# Patient Record
Sex: MALE | Race: WHITE | NOT HISPANIC OR LATINO | Employment: FULL TIME | URBAN - METROPOLITAN AREA
[De-identification: names, ages, dates, MRNs, and addresses within clinical notes are randomized per-mention and may not be internally consistent; named-entity substitution may affect disease eponyms.]

---

## 2019-06-12 ENCOUNTER — OFFICE VISIT (OUTPATIENT)
Dept: FAMILY MEDICINE CLINIC | Facility: CLINIC | Age: 24
End: 2019-06-12

## 2019-06-12 VITALS
HEART RATE: 80 BPM | HEIGHT: 71 IN | TEMPERATURE: 98.1 F | BODY MASS INDEX: 33.32 KG/M2 | DIASTOLIC BLOOD PRESSURE: 80 MMHG | SYSTOLIC BLOOD PRESSURE: 130 MMHG | WEIGHT: 238 LBS

## 2019-06-12 DIAGNOSIS — Z11.3 ROUTINE SCREENING FOR STI (SEXUALLY TRANSMITTED INFECTION): ICD-10-CM

## 2019-06-12 DIAGNOSIS — S93.401A SPRAIN OF RIGHT ANKLE, UNSPECIFIED LIGAMENT, INITIAL ENCOUNTER: ICD-10-CM

## 2019-06-12 DIAGNOSIS — Z11.1 TUBERCULOSIS SCREENING: ICD-10-CM

## 2019-06-12 DIAGNOSIS — Z13.1 SCREENING FOR DIABETES MELLITUS: ICD-10-CM

## 2019-06-12 DIAGNOSIS — E66.09 CLASS 1 OBESITY DUE TO EXCESS CALORIES WITHOUT SERIOUS COMORBIDITY WITH BODY MASS INDEX (BMI) OF 33.0 TO 33.9 IN ADULT: ICD-10-CM

## 2019-06-12 DIAGNOSIS — Z13.29 SCREENING FOR THYROID DISORDER: ICD-10-CM

## 2019-06-12 DIAGNOSIS — Z13.0 SCREENING FOR DEFICIENCY ANEMIA: Primary | ICD-10-CM

## 2019-06-12 DIAGNOSIS — Z13.220 SCREENING FOR LIPID DISORDERS: ICD-10-CM

## 2019-06-12 PROCEDURE — 99204 OFFICE O/P NEW MOD 45 MIN: CPT | Performed by: NURSE PRACTITIONER

## 2019-06-12 NOTE — PROGRESS NOTES
Reid Leonardo presents today to establish care.    Chief Complaint   Patient presents with   • Establish Care     new patient today he is here to establish care and have paperwork completed.        HPI   Reid presents today to establish care.  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  He is interested in becoming a .  Other concerns include sprained ankle about 4 weeks ago.    Sprained ankle- right ankle  New.  Stable.  Reports that he was walking and carrying something very heavy, stepped in a hole, rolled his ankle sideways and pitched forward.  Did not see a provider or get an x-ray completed.  Was wearing an Ace wrap and icing affected area.  Stopped wearing a brace and icing it 1 week ago and continues to have pain when he turns it a certain way.  Does not wish to have imaging done yet.  Just wanted to let me know.    [facility] lab work needed:  Needs paperwork completed  Ppd (tspot)  hiv  Cbc  Electrolytes  lfts  Thyroid  Lipids  Urine  ekg-- will complete at next visit    Health Maintenance:  -Sees opthalmology regularly. Does not have dentist. Will work on that before next visit.  -not sexually active  -Use of seatbelt 100% of the time  -Smoke/CO detector working in home  -Nonsmoker  -Vaccines UTD he thinks-- he will bring high school records to next appt      PHQ-2/PHQ-9 Depression Screening 6/12/2019   Little interest or pleasure in doing things 0   Feeling down, depressed, or hopeless 0   Total Score 0       Review of Systems   Constitutional: Negative for activity change, fatigue, unexpected weight gain and unexpected weight loss.   HENT: Negative for congestion.    Eyes: Negative for blurred vision and visual disturbance.   Respiratory: Negative for chest tightness, shortness of breath and wheezing.    Cardiovascular: Negative for chest pain, palpitations and leg swelling.   Gastrointestinal: Negative for constipation, diarrhea, nausea, vomiting and GERD.  "  Musculoskeletal: Negative for arthralgias (right ankle).   Neurological: Negative for dizziness, light-headedness, headache and confusion.   Psychiatric/Behavioral: Negative for depressed mood and stress. The patient is not nervous/anxious.    All other systems reviewed and are negative.       Past Medical History:   Diagnosis Date   • Depression    • Headache         History reviewed. No pertinent surgical history.     Family History   Problem Relation Age of Onset   • Arthritis Mother    • Cancer Mother    • Diabetes Mother    • Mental illness Mother    • Obesity Mother    • Arthritis Father    • Diabetes Father    • Hypertension Father    • Migraines Father    • Obesity Father    • Heart attack Maternal Grandfather         Social History     Socioeconomic History   • Marital status: Single     Spouse name: Not on file   • Number of children: Not on file   • Years of education: Not on file   • Highest education level: Not on file   Occupational History   • Occupation: seminary and missionary work     Employer: [employer]   Tobacco Use   • Smoking status: Never Smoker   • Smokeless tobacco: Never Used   Substance and Sexual Activity   • Alcohol use: Yes     Comment: 2 per week   • Drug use: No   • Sexual activity: Defer        No current outpatient medications on file prior to visit.     No current facility-administered medications on file prior to visit.        Allergies   Allergen Reactions   • Menthol Unknown (See Comments)     Breathing problem        Visit Vitals  /80   Pulse 80   Temp 98.1 °F (36.7 °C)   Ht 179.1 cm (70.5\")   Wt 108 kg (238 lb)   BMI 33.67 kg/m²        Physical Exam   Constitutional: He is oriented to person, place, and time. Vital signs are normal. He appears well-developed and well-nourished.   HENT:   Head: Normocephalic.   Right Ear: Hearing, tympanic membrane, external ear and ear canal normal.   Left Ear: Hearing, tympanic membrane, external ear and ear canal normal. " "  Nose: Nose normal.   Mouth/Throat: Oropharynx is clear and moist.   Eyes: Lids are normal.   Cardiovascular: Normal rate, regular rhythm, normal heart sounds and intact distal pulses.   Pulmonary/Chest: Effort normal and breath sounds normal.   Abdominal: Soft. Bowel sounds are normal.   Musculoskeletal: Normal range of motion.        Right ankle: He exhibits normal range of motion, no swelling, no ecchymosis and normal pulse. Tenderness.   Tenderness with anterior medial movement. Describes as \"sharp discomfort but not really pain\"        Lymphadenopathy:        Head (right side): No submental, no submandibular, no tonsillar, no preauricular, no posterior auricular and no occipital adenopathy present.        Head (left side): No submental, no submandibular, no tonsillar, no preauricular, no posterior auricular and no occipital adenopathy present.     He has no cervical adenopathy.   Neurological: He is alert and oriented to person, place, and time. GCS eye subscore is 4. GCS verbal subscore is 5. GCS motor subscore is 6.   Skin: Skin is warm, dry and intact.   Psychiatric: He has a normal mood and affect. His speech is normal and behavior is normal. Judgment and thought content normal.   Nursing note and vitals reviewed.       No results found for this or any previous visit.     Assessment/Plan  Reid was seen today for establish care.    Diagnoses and all orders for this visit:    Screening for deficiency anemia  -     CBC & Differential; Future  Screening for diabetes mellitus  -     Comprehensive Metabolic Panel; Future  -     MicroAlbumin, Urine, Random - Urine, Clean Catch; Future  Screening for lipid disorders  -     Lipid Panel; Future  Screening for thyroid disorder  -     TSH Rfx On Abnormal To Free T4; Future  Routine screening for STI (sexually transmitted infection)  -     HIV-1 / O / 2 Ag / Antibody 4th Generation; Future  Tuberculosis screening  -     TSPOT; Future    Sprain of right ankle, " unspecified ligament, initial encounter  -Contine to rest, ice, compress, and elevate effected area  -If pain is still occurring at next visit, we will xray.    Obesity  Patient's Body mass index is 33.67 kg/m². BMI is above normal parameters. Recommendations include: educational material, exercise counseling and nutrition counseling.      Return in about 1 week (around 6/19/2019) for Annual. EKG and paperwork will be completed at next visit.

## 2019-06-12 NOTE — PATIENT INSTRUCTIONS
Mediterranean Diet  A Mediterranean diet refers to food and lifestyle choices that are based on the traditions of countries located on the [place]. This way of eating has been shown to help prevent certain conditions and improve outcomes for people who have chronic diseases, like kidney disease and heart disease.  What are tips for following this plan?  Lifestyle  · Cook and eat meals together with your family, when possible.  · Drink enough fluid to keep your urine clear or pale yellow.  · Be physically active every day. This includes:  ? Aerobic exercise like running or swimming.  ? Leisure activities like gardening, walking, or housework.  · Get 7-8 hours of sleep each night.  · If recommended by your health care provider, drink red wine in moderation. This means 1 glass a day for nonpregnant women and 2 glasses a day for men. A glass of wine equals 5 oz (150 mL).  Reading food labels  · Check the serving size of packaged foods. For foods such as rice and pasta, the serving size refers to the amount of cooked product, not dry.  · Check the total fat in packaged foods. Avoid foods that have saturated fat or trans fats.  · Check the ingredients list for added sugars, such as corn syrup.  Shopping  · At the grocery store, buy most of your food from the areas near the walls of the store. This includes:  ? Fresh fruits and vegetables (produce).  ? Grains, beans, nuts, and seeds. Some of these may be available in unpackaged forms or large amounts (in bulk).  ? Fresh seafood.  ? Poultry and eggs.  ? Low-fat dairy products.  · Buy whole ingredients instead of prepackaged foods.  · Buy fresh fruits and vegetables in-season from local farmers markets.  · Buy frozen fruits and vegetables in resealable bags.  · If you do not have access to quality fresh seafood, buy precooked frozen shrimp or canned fish, such as tuna, salmon, or sardines.  · Buy small amounts of raw or cooked vegetables, salads, or olives from the  deli or salad bar at your store.  · Stock your pantry so you always have certain foods on hand, such as olive oil, canned tuna, canned tomatoes, rice, pasta, and beans.  Cooking  · Cook foods with extra-virgin olive oil instead of using butter or other vegetable oils.  · Have meat as a side dish, and have vegetables or grains as your main dish. This means having meat in small portions or adding small amounts of meat to foods like pasta or stew.  · Use beans or vegetables instead of meat in common dishes like chili or lasagna.  · Biddle with different cooking methods. Try roasting or broiling vegetables instead of steaming or sautéeing them.  · Add frozen vegetables to soups, stews, pasta, or rice.  · Add nuts or seeds for added healthy fat at each meal. You can add these to yogurt, salads, or vegetable dishes.  · Marinate fish or vegetables using olive oil, lemon juice, garlic, and fresh herbs.  Meal planning  · Plan to eat 1 vegetarian meal one day each week. Try to work up to 2 vegetarian meals, if possible.  · Eat seafood 2 or more times a week.  · Have healthy snacks readily available, such as:  ? Vegetable sticks with hummus.  ? Greek yogurt.  ? Fruit and nut trail mix.  · Eat balanced meals throughout the week. This includes:  ? Fruit: 2-3 servings a day  ? Vegetables: 4-5 servings a day  ? Low-fat dairy: 2 servings a day  ? Fish, poultry, or lean meat: 1 serving a day  ? Beans and legumes: 2 or more servings a week  ? Nuts and seeds: 1-2 servings a day  ? Whole grains: 6-8 servings a day  ? Extra-virgin olive oil: 3-4 servings a day  · Limit red meat and sweets to only a few servings a month  What are my food choices?  · Mediterranean diet  ? Recommended  ? Grains: Whole-grain pasta. Brown rice. Bulgar wheat. Polenta. Couscous. Whole-wheat bread. Oatmeal. Quinoa.  ? Vegetables: Artichokes. Beets. Broccoli. Cabbage. Carrots. Eggplant. Green beans. Chard. Kale. Spinach. Onions. Leeks. Peas. Squash.  Tomatoes. Peppers. Radishes.  ? Fruits: Apples. Apricots. Avocado. Berries. Bananas. Cherries. Dates. Figs. Grapes. Douglas. Melon. Oranges. Peaches. Plums. Pomegranate.  ? Meats and other protein foods: Beans. Almonds. Sunflower seeds. Pine nuts. Peanuts. Cod. [place]. Scallops. Shrimp. Tuna. Tilapia. Clams. Oysters. Eggs.  ? Dairy: Low-fat milk. Cheese. Greek yogurt.  ? Beverages: Water. Red wine. Herbal tea.  ? Fats and oils: Extra virgin olive oil. Avocado oil. Grape seed oil.  ? Sweets and desserts: Greek yogurt with honey. Baked apples. Poached pears. Trail mix.  ? Seasoning and other foods: Basil. Cilantro. Coriander. Cumin. Mint. Parsley. Eduardo. Rosemary. Tarragon. Garlic. Oregano. Thyme. Pepper. Balsalmic vinegar. Tahini. Hummus. Tomato sauce. Olives. Mushrooms.  ? Limit these  ? Grains: Prepackaged pasta or rice dishes. Prepackaged cereal with added sugar.  ? Vegetables: Deep fried potatoes (french fries).  ? Fruits: Fruit canned in syrup.  ? Meats and other protein foods: Beef. Pork. Lamb. Poultry with skin. Hot dogs. Val[place].  ? Dairy: Ice cream. Sour cream. Whole milk.  ? Beverages: Juice. Sugar-sweetened soft drinks. Beer. Liquor and spirits.  ? Fats and oils: Butter. Canola oil. Vegetable oil. Beef fat (tallow). Lard.  ? Sweets and desserts: Cookies. Cakes. Pies. Candy.  ? Seasoning and other foods: Mayonnaise. Premade sauces and marinades.  ? The items listed may not be a complete list. Talk with your dietitian about what dietary choices are right for you.  Summary  · The Mediterranean diet includes both food and lifestyle choices.  · Eat a variety of fresh fruits and vegetables, beans, nuts, seeds, and whole grains.  · Limit the amount of red meat and sweets that you eat.  · Talk with your health care provider about whether it is safe for you to drink red wine in moderation. This means 1 glass a day for nonpregnant women and 2 glasses a day for men. A glass of wine equals 5 oz (150 mL).  This information  is not intended to replace advice given to you by your health care provider. Make sure you discuss any questions you have with your health care provider.  Document Released: 08/10/2017 Document Revised: 09/12/2017 Document Reviewed: 08/10/2017  [employer] Interactive Patient Education © 2019 [employer]

## 2019-06-13 ENCOUNTER — LAB (OUTPATIENT)
Dept: LAB | Facility: HOSPITAL | Age: 24
End: 2019-06-13

## 2019-06-13 DIAGNOSIS — Z13.220 SCREENING FOR LIPID DISORDERS: ICD-10-CM

## 2019-06-13 DIAGNOSIS — Z13.1 SCREENING FOR DIABETES MELLITUS: ICD-10-CM

## 2019-06-13 DIAGNOSIS — Z11.3 ROUTINE SCREENING FOR STI (SEXUALLY TRANSMITTED INFECTION): ICD-10-CM

## 2019-06-13 DIAGNOSIS — Z11.1 TUBERCULOSIS SCREENING: ICD-10-CM

## 2019-06-13 DIAGNOSIS — Z13.0 SCREENING FOR DEFICIENCY ANEMIA: ICD-10-CM

## 2019-06-13 DIAGNOSIS — Z13.29 SCREENING FOR THYROID DISORDER: ICD-10-CM

## 2019-06-13 LAB
ALBUMIN SERPL-MCNC: 4.9 G/DL (ref 3.5–5.2)
ALBUMIN UR-MCNC: <1.2 MG/L
ALBUMIN/GLOB SERPL: 1.8 G/DL
ALP SERPL-CCNC: 44 U/L (ref 39–117)
ALT SERPL W P-5'-P-CCNC: 23 U/L (ref 1–41)
ANION GAP SERPL CALCULATED.3IONS-SCNC: 13.6 MMOL/L
AST SERPL-CCNC: 21 U/L (ref 1–40)
BASOPHILS # BLD AUTO: 0.06 10*3/MM3 (ref 0–0.2)
BASOPHILS NFR BLD AUTO: 1 % (ref 0–1.5)
BILIRUB SERPL-MCNC: 1.7 MG/DL (ref 0.2–1.2)
BUN BLD-MCNC: 14 MG/DL (ref 6–20)
BUN/CREAT SERPL: 15.1 (ref 7–25)
CALCIUM SPEC-SCNC: 9.9 MG/DL (ref 8.6–10.5)
CHLORIDE SERPL-SCNC: 96 MMOL/L (ref 98–107)
CHOLEST SERPL-MCNC: 163 MG/DL (ref 0–200)
CO2 SERPL-SCNC: 25.4 MMOL/L (ref 22–29)
CREAT BLD-MCNC: 0.93 MG/DL (ref 0.76–1.27)
DEPRECATED RDW RBC AUTO: 42.5 FL (ref 37–54)
EOSINOPHIL # BLD AUTO: 0.08 10*3/MM3 (ref 0–0.4)
EOSINOPHIL NFR BLD AUTO: 1.3 % (ref 0.3–6.2)
ERYTHROCYTE [DISTWIDTH] IN BLOOD BY AUTOMATED COUNT: 12 % (ref 12.3–15.4)
GFR SERPL CREATININE-BSD FRML MDRD: 101 ML/MIN/1.73
GLOBULIN UR ELPH-MCNC: 2.7 GM/DL
GLUCOSE BLD-MCNC: 84 MG/DL (ref 65–99)
HCT VFR BLD AUTO: 48.9 % (ref 37.5–51)
HDLC SERPL-MCNC: 38 MG/DL (ref 40–60)
HGB BLD-MCNC: 15.3 G/DL (ref 13–17.7)
HIV1+2 AB SER QL: NORMAL
IMM GRANULOCYTES # BLD AUTO: 0.02 10*3/MM3 (ref 0–0.05)
IMM GRANULOCYTES NFR BLD AUTO: 0.3 % (ref 0–0.5)
LDLC SERPL CALC-MCNC: 85 MG/DL (ref 0–100)
LDLC/HDLC SERPL: 2.25 {RATIO}
LYMPHOCYTES # BLD AUTO: 1.89 10*3/MM3 (ref 0.7–3.1)
LYMPHOCYTES NFR BLD AUTO: 30 % (ref 19.6–45.3)
MCH RBC QN AUTO: 29.8 PG (ref 26.6–33)
MCHC RBC AUTO-ENTMCNC: 31.3 G/DL (ref 31.5–35.7)
MCV RBC AUTO: 95.1 FL (ref 79–97)
MONOCYTES # BLD AUTO: 0.56 10*3/MM3 (ref 0.1–0.9)
MONOCYTES NFR BLD AUTO: 8.9 % (ref 5–12)
NEUTROPHILS # BLD AUTO: 3.68 10*3/MM3 (ref 1.7–7)
NEUTROPHILS NFR BLD AUTO: 58.5 % (ref 42.7–76)
NRBC BLD AUTO-RTO: 0 /100 WBC (ref 0–0.2)
PLATELET # BLD AUTO: 230 10*3/MM3 (ref 140–450)
PMV BLD AUTO: 11.3 FL (ref 6–12)
POTASSIUM BLD-SCNC: 4.5 MMOL/L (ref 3.5–5.2)
PROT SERPL-MCNC: 7.6 G/DL (ref 6–8.5)
RBC # BLD AUTO: 5.14 10*6/MM3 (ref 4.14–5.8)
SODIUM BLD-SCNC: 135 MMOL/L (ref 136–145)
TRIGL SERPL-MCNC: 198 MG/DL (ref 0–150)
TSH SERPL DL<=0.05 MIU/L-ACNC: 2.85 MIU/ML (ref 0.27–4.2)
VLDLC SERPL-MCNC: 39.6 MG/DL (ref 5–40)
WBC NRBC COR # BLD: 6.29 10*3/MM3 (ref 3.4–10.8)

## 2019-06-13 PROCEDURE — 36415 COLL VENOUS BLD VENIPUNCTURE: CPT

## 2019-06-13 PROCEDURE — 86481 TB AG RESPONSE T-CELL SUSP: CPT | Performed by: NURSE PRACTITIONER

## 2019-06-13 PROCEDURE — 80061 LIPID PANEL: CPT | Performed by: NURSE PRACTITIONER

## 2019-06-13 PROCEDURE — 85025 COMPLETE CBC W/AUTO DIFF WBC: CPT | Performed by: NURSE PRACTITIONER

## 2019-06-13 PROCEDURE — 84443 ASSAY THYROID STIM HORMONE: CPT | Performed by: NURSE PRACTITIONER

## 2019-06-13 PROCEDURE — 82043 UR ALBUMIN QUANTITATIVE: CPT | Performed by: NURSE PRACTITIONER

## 2019-06-13 PROCEDURE — 80053 COMPREHEN METABOLIC PANEL: CPT | Performed by: NURSE PRACTITIONER

## 2019-06-13 PROCEDURE — G0432 EIA HIV-1/HIV-2 SCREEN: HCPCS | Performed by: NURSE PRACTITIONER

## 2019-06-15 LAB
TSPOT INTERPRETATION: NEGATIVE
TSPOT NIL CONTROL INTERPRETATION: NORMAL
TSPOT PANEL A: 0
TSPOT PANEL B: 0
TSPOT POS CONTROL INTERPRETATION: NORMAL

## 2019-06-19 ENCOUNTER — OFFICE VISIT (OUTPATIENT)
Dept: FAMILY MEDICINE CLINIC | Facility: CLINIC | Age: 24
End: 2019-06-19

## 2019-06-19 VITALS
WEIGHT: 245 LBS | TEMPERATURE: 97.5 F | HEIGHT: 71 IN | DIASTOLIC BLOOD PRESSURE: 78 MMHG | SYSTOLIC BLOOD PRESSURE: 120 MMHG | HEART RATE: 80 BPM | BODY MASS INDEX: 34.3 KG/M2

## 2019-06-19 DIAGNOSIS — Z13.6 ENCOUNTER FOR SCREENING FOR CARDIOVASCULAR DISORDERS: ICD-10-CM

## 2019-06-19 DIAGNOSIS — Z28.21 VACCINATION NOT CARRIED OUT BECAUSE OF PATIENT REFUSAL: ICD-10-CM

## 2019-06-19 DIAGNOSIS — Z00.00 ANNUAL PHYSICAL EXAM: Primary | ICD-10-CM

## 2019-06-19 PROCEDURE — 93000 ELECTROCARDIOGRAM COMPLETE: CPT | Performed by: NURSE PRACTITIONER

## 2019-06-19 PROCEDURE — 99395 PREV VISIT EST AGE 18-39: CPT | Performed by: NURSE PRACTITIONER

## 2019-06-19 NOTE — PROGRESS NOTES
Chief Complaint   Patient presents with   • Annual Exam        HPI   Reid presents for annual exam and paperwork completion.  No concerns today.  Lab work has been completed and all looks good, I have no concerns.  We will complete an EKG today.      Review of Systems   Constitutional: Negative for activity change, fatigue, unexpected weight gain and unexpected weight loss.   HENT: Negative for congestion.    Eyes: Negative for blurred vision and visual disturbance.   Respiratory: Negative for chest tightness, shortness of breath and wheezing.    Cardiovascular: Negative for chest pain, palpitations and leg swelling.   Gastrointestinal: Negative for constipation, diarrhea, nausea, vomiting and GERD.   Musculoskeletal: Negative for arthralgias.   Neurological: Negative for dizziness, light-headedness, headache and confusion.   Psychiatric/Behavioral: Negative for depressed mood and stress. The patient is not nervous/anxious.    All other systems reviewed and are negative.       No current outpatient medications on file prior to visit.     No current facility-administered medications on file prior to visit.        Allergies   Allergen Reactions   • Menthol Unknown (See Comments)     Breathing problem       Past Medical History:   Diagnosis Date   • Depression    • Headache         History reviewed. No pertinent surgical history.     Family History   Problem Relation Age of Onset   • Arthritis Mother    • Cancer Mother    • Diabetes Mother    • Mental illness Mother    • Obesity Mother    • Arthritis Father    • Diabetes Father    • Hypertension Father    • Migraines Father    • Obesity Father    • Heart attack Maternal Grandfather         Social History     Socioeconomic History   • Marital status: Single     Spouse name: Not on file   • Number of children: Not on file   • Years of education: Not on file   • Highest education level: Not on file   Occupational History   • Occupation: seminary and missionary work      "Employer: [employer]   Tobacco Use   • Smoking status: Never Smoker   • Smokeless tobacco: Never Used   Substance and Sexual Activity   • Alcohol use: Yes     Comment: 2 per week   • Drug use: No   • Sexual activity: Defer        Visit Vitals  /78   Pulse 80   Temp 97.5 °F (36.4 °C)   Ht 179.1 cm (70.5\")   Wt 111 kg (245 lb)   BMI 34.66 kg/m²          Physical Exam   Constitutional: He is oriented to person, place, and time. Vital signs are normal. He appears well-developed and well-nourished. No distress.   HENT:   Head: Normocephalic.   Right Ear: Hearing, tympanic membrane, external ear and ear canal normal.   Left Ear: Hearing, tympanic membrane, external ear and ear canal normal.   Nose: Nose normal.   Mouth/Throat: Uvula is midline and oropharynx is clear and moist.   Eyes: Conjunctivae and EOM are normal. Pupils are equal, round, and reactive to light. Right eye exhibits no discharge. Left eye exhibits no discharge. No scleral icterus.   Neck: Normal range of motion. No JVD present. Carotid bruit is not present. No thyromegaly present.   Cardiovascular: Normal rate, regular rhythm, normal heart sounds and intact distal pulses. Exam reveals no gallop and no friction rub.   No murmur heard.  Pulmonary/Chest: Effort normal and breath sounds normal. No respiratory distress.   Abdominal: Soft. Normal appearance, normal aorta and bowel sounds are normal. He exhibits no distension and no mass. There is no hepatosplenomegaly. There is no tenderness. There is no guarding, no CVA tenderness, no tenderness at McBurney's point and negative Zuniga's sign. No hernia.   Musculoskeletal: Normal range of motion. He exhibits no edema.   Lymphadenopathy:        Head (right side): No submental, no submandibular, no tonsillar, no preauricular, no posterior auricular and no occipital adenopathy present.        Head (left side): No submental, no submandibular, no tonsillar, no preauricular, no posterior auricular and " no occipital adenopathy present.     He has no cervical adenopathy.   Neurological: He is alert and oriented to person, place, and time. He has normal strength. He displays normal reflexes. No sensory deficit. Coordination normal. GCS eye subscore is 4. GCS verbal subscore is 5. GCS motor subscore is 6.   Skin: Skin is warm, dry and intact. Capillary refill takes less than 2 seconds. No rash noted.   Psychiatric: He has a normal mood and affect. His speech is normal and behavior is normal. Judgment and thought content normal.   Nursing note and vitals reviewed.      Results for orders placed or performed in visit on 06/13/19   Comprehensive Metabolic Panel   Result Value Ref Range    Glucose 84 65 - 99 mg/dL    BUN 14 6 - 20 mg/dL    Creatinine 0.93 0.76 - 1.27 mg/dL    Sodium 135 (L) 136 - 145 mmol/L    Potassium 4.5 3.5 - 5.2 mmol/L    Chloride 96 (L) 98 - 107 mmol/L    CO2 25.4 22.0 - 29.0 mmol/L    Calcium 9.9 8.6 - 10.5 mg/dL    Total Protein 7.6 6.0 - 8.5 g/dL    Albumin 4.90 3.50 - 5.20 g/dL    ALT (SGPT) 23 1 - 41 U/L    AST (SGOT) 21 1 - 40 U/L    Alkaline Phosphatase 44 39 - 117 U/L    Total Bilirubin 1.7 (H) 0.2 - 1.2 mg/dL    eGFR Non African Amer 101 >60 mL/min/1.73    Globulin 2.7 gm/dL    A/G Ratio 1.8 g/dL    BUN/Creatinine Ratio 15.1 7.0 - 25.0    Anion Gap 13.6 mmol/L   Lipid Panel   Result Value Ref Range    Total Cholesterol 163 0 - 200 mg/dL    Triglycerides 198 (H) 0 - 150 mg/dL    HDL Cholesterol 38 (L) 40 - 60 mg/dL    LDL Cholesterol  85 0 - 100 mg/dL    VLDL Cholesterol 39.6 5 - 40 mg/dL    LDL/HDL Ratio 2.25    TSH Rfx On Abnormal To Free T4   Result Value Ref Range    TSH 2.850 0.270 - 4.200 mIU/mL   HIV-1 / O / 2 Ag / Antibody 4th Generation   Result Value Ref Range    HIV-1/ HIV-2 Non-Reactive Non-Reactive   MicroAlbumin, Urine, Random - Urine, Clean Catch   Result Value Ref Range    Microalbumin, Urine <1.2 mg/L   TSPOT   Result Value Ref Range    TSPOTTB Negative Negative    T-SPOT  Panel A 0     T-SPOT Panel B 0     TSPOT NIL CONTROL INTERP Passed     TSPOT POS CONTROL INTERP Passed    CBC Auto Differential   Result Value Ref Range    WBC 6.29 3.40 - 10.80 10*3/mm3    RBC 5.14 4.14 - 5.80 10*6/mm3    Hemoglobin 15.3 13.0 - 17.7 g/dL    Hematocrit 48.9 37.5 - 51.0 %    MCV 95.1 79.0 - 97.0 fL    MCH 29.8 26.6 - 33.0 pg    MCHC 31.3 (L) 31.5 - 35.7 g/dL    RDW 12.0 (L) 12.3 - 15.4 %    RDW-SD 42.5 37.0 - 54.0 fl    MPV 11.3 6.0 - 12.0 fL    Platelets 230 140 - 450 10*3/mm3    Neutrophil % 58.5 42.7 - 76.0 %    Lymphocyte % 30.0 19.6 - 45.3 %    Monocyte % 8.9 5.0 - 12.0 %    Eosinophil % 1.3 0.3 - 6.2 %    Basophil % 1.0 0.0 - 1.5 %    Immature Grans % 0.3 0.0 - 0.5 %    Neutrophils, Absolute 3.68 1.70 - 7.00 10*3/mm3    Lymphocytes, Absolute 1.89 0.70 - 3.10 10*3/mm3    Monocytes, Absolute 0.56 0.10 - 0.90 10*3/mm3    Eosinophils, Absolute 0.08 0.00 - 0.40 10*3/mm3    Basophils, Absolute 0.06 0.00 - 0.20 10*3/mm3    Immature Grans, Absolute 0.02 0.00 - 0.05 10*3/mm3    nRBC 0.0 0.0 - 0.2 /100 WBC        ECG 12 Lead  Date/Time: 6/19/2019 4:03 PM  Performed by: Carie Orellana APRN  Authorized by: Carie Orellana APRN   Comparison: not compared with previous ECG   Previous ECG: no previous ECG available  Rhythm: sinus rhythm  Rate: normal  Conduction: conduction normal  ST Segments: ST segments normal  T Waves: T waves normal  QRS axis: normal  Other: no other findings    Clinical impression: normal ECG          Reid was seen today for annual exam.    Diagnoses and all orders for this visit:    Annual physical exam    Encounter for screening for cardiovascular disorders  -     ECG 12 Lead- NSR    Vaccination not carried out because of patient refusal  -Declines Tdap today. He believes he has already had this and plans to find immunization records.    Assessment/Plan   1. Healthy male exam.    2. Patient Counseling: Including but not Limited to the following, when appropriate:  --Nutrition: Stressed  importance of moderation in sodium/caffeine intake, saturated fat and cholesterol, caloric balance, sufficient intake of fresh fruits, vegetables, fiber, calcium, iron, and 1 mg of folate supplement per day (for females capable of pregnancy).  --Discussed the issue of estrogen replacement, calcium supplement, and the daily use of baby aspirin.  --Exercise: Stressed the importance of regular exercise.   --Substance Abuse: Discussed cessation/primary prevention of tobacco, alcohol, or other drug use; driving or other dangerous activities under the influence; availability of treatment for abuse, as indicated based on social history.    --Sexuality: Discussed sexually transmitted diseases, partner selection, use of condoms, avoidance of unintended pregnancy  and contraceptive alternatives.   --Injury prevention: Discussed safety belts, safety helmets, smoke detector, smoking near bedding or upholstery.   --Dental health: Discussed importance of regular tooth brushing, flossing, and dental visits.  --Immunizations reviewed.  --Discussed benefits of colon cancer screening.      3. Discussed the patient's BMI with him.  The BMI is above average; BMI management plan is completed   Patient's Body mass index is 34.66 kg/m². BMI is above normal parameters. Recommendations include: educational material, exercise counseling and nutrition counseling.    4. Return in about 1 year (around 6/19/2020) for Annual.  5. Age-appropriate Screening Scheduled  6. There are no Patient Instructions on file for this visit.         Return in about 1 year (around 6/19/2020) for Annual.

## 2021-08-02 ENCOUNTER — OFFICE VISIT (OUTPATIENT)
Dept: FAMILY MEDICINE CLINIC | Facility: CLINIC | Age: 26
End: 2021-08-02

## 2021-08-02 VITALS
OXYGEN SATURATION: 100 % | WEIGHT: 257.2 LBS | BODY MASS INDEX: 36.01 KG/M2 | DIASTOLIC BLOOD PRESSURE: 92 MMHG | HEART RATE: 94 BPM | SYSTOLIC BLOOD PRESSURE: 148 MMHG | HEIGHT: 71 IN

## 2021-08-02 DIAGNOSIS — M75.101 ROTATOR CUFF SYNDROME OF RIGHT SHOULDER: ICD-10-CM

## 2021-08-02 DIAGNOSIS — M25.511 CHRONIC RIGHT SHOULDER PAIN: ICD-10-CM

## 2021-08-02 DIAGNOSIS — Z00.00 PREVENTATIVE HEALTH CARE: ICD-10-CM

## 2021-08-02 DIAGNOSIS — G89.29 CHRONIC RIGHT SHOULDER PAIN: ICD-10-CM

## 2021-08-02 DIAGNOSIS — I10 ESSENTIAL HYPERTENSION: Primary | ICD-10-CM

## 2021-08-02 PROCEDURE — 99204 OFFICE O/P NEW MOD 45 MIN: CPT | Performed by: PHYSICIAN ASSISTANT

## 2021-08-02 RX ORDER — LISINOPRIL 10 MG/1
10 TABLET ORAL DAILY
Qty: 30 TABLET | Refills: 1 | Status: SHIPPED | OUTPATIENT
Start: 2021-08-02 | End: 2021-08-16 | Stop reason: SDUPTHER

## 2021-08-02 NOTE — PROGRESS NOTES
Chief Complaint   Patient presents with   • Establish Care   • Hypertension   • Joint Pain     Pt states joints have been hurting for a while. Pt states right shouilder,  left wrist, and left knee pain have been giving him more trouble since march. Pt states he has limited mobility in his shoulder       HPI     Reid Leonardo is a 25 y.o. male who is here for follow-up of chronic conditions.  He was last seen at this office by Carie in June 2019 for a physical.     Dx COVID in February and was told he had high blood pressure at that time. He has not checked his BP at home. 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 Currently living in [place], [place].      Has joint pain and reduced ROM right shoulder chronically. Patient states injured AC joint during skiing accident in 8th grade when symptoms began.  He had an x-ray at that time.  Has difficulty lifting right arm above shoulder. Will flare up in pain with weather changes. He has pain with throwing a football. Has never been to physical therapy.    Past Medical History:   Diagnosis Date   • Fracture    • Headache    • Hypertension        History reviewed. No pertinent surgical history.    Family History   Problem Relation Age of Onset   • Arthritis Mother    • Cancer Mother    • Diabetes Mother    • Mental illness Mother    • Obesity Mother    • Arthritis Father    • Diabetes Father    • Hypertension Father    • Migraines Father    • Obesity Father    • Heart attack Maternal Grandfather    • Asthma Maternal Grandfather    • Asthma Maternal Grandmother        Social History     Socioeconomic History   • Marital status: Single     Spouse name: Not on file   • Number of children: Not on file   • Years of education: Not on file   • Highest education level: Not on file   Tobacco Use   • Smoking status: Never Smoker   • Smokeless tobacco: Never Used   Substance and Sexual Activity   • Alcohol use: Yes     Comment: 2 per week   • Drug use: No   • Sexual  activity: Defer       Allergies   Allergen Reactions   • Menthol Unknown (See Comments)     Breathing problem       ROS    Review of Systems   Constitutional: Negative.  Negative for appetite change, chills, diaphoresis, fatigue, fever, unexpected weight gain and unexpected weight loss.   HENT: Negative.  Negative for ear pain, hearing loss, nosebleeds, rhinorrhea, sore throat, trouble swallowing and voice change.    Eyes: Negative.  Negative for pain, redness, itching and visual disturbance.   Respiratory: Negative.  Negative for cough, shortness of breath and wheezing.    Cardiovascular: Negative.  Negative for chest pain, palpitations and leg swelling.   Gastrointestinal: Negative.  Negative for abdominal pain, blood in stool, constipation, diarrhea, nausea, vomiting and GERD.   Endocrine: Negative.  Negative for cold intolerance and heat intolerance.   Genitourinary: Negative.  Negative for dysuria, frequency, hematuria, urgency and urinary incontinence.   Musculoskeletal: Positive for arthralgias and joint swelling. Negative for gait problem and myalgias.   Skin: Negative.  Negative for color change, rash and skin lesions.   Allergic/Immunologic: Negative.    Neurological: Negative.  Negative for dizziness, seizures, syncope, weakness, light-headedness and numbness.   Hematological: Negative.  Negative for adenopathy. Does not bruise/bleed easily.   Psychiatric/Behavioral: Negative.  Negative for behavioral problems, sleep disturbance, suicidal ideas and depressed mood. The patient is not nervous/anxious.        Vitals:    08/02/21 1202   BP: 148/92   Pulse:    SpO2:      Body mass index is 36.37 kg/m².      Current Outpatient Medications:   •  lisinopril (PRINIVIL,ZESTRIL) 10 MG tablet, Take 1 tablet by mouth Daily., Disp: 30 tablet, Rfl: 1    PE    Physical Exam  Vitals reviewed.   Constitutional:       General: He is not in acute distress.     Appearance: He is well-developed. He is obese.   HENT:      Head:  Normocephalic and atraumatic.   Eyes:      Conjunctiva/sclera: Conjunctivae normal.   Cardiovascular:      Rate and Rhythm: Normal rate and regular rhythm.      Heart sounds: Normal heart sounds. No murmur heard.     Pulmonary:      Effort: Pulmonary effort is normal.      Breath sounds: Normal breath sounds.   Musculoskeletal:      Right shoulder: No tenderness. Decreased range of motion. Decreased strength.      Cervical back: Normal range of motion.      Comments: + mild weakness empty cup test on right   Skin:     General: Skin is warm and dry.   Neurological:      Mental Status: He is alert.      Gait: Gait normal.   Psychiatric:         Speech: Speech normal.         Behavior: Behavior normal.         Results    Results for orders placed or performed in visit on 06/13/19   Comprehensive Metabolic Panel    Specimen: Blood   Result Value Ref Range    Glucose 84 65 - 99 mg/dL    BUN 14 6 - 20 mg/dL    Creatinine 0.93 0.76 - 1.27 mg/dL    Sodium 135 (L) 136 - 145 mmol/L    Potassium 4.5 3.5 - 5.2 mmol/L    Chloride 96 (L) 98 - 107 mmol/L    CO2 25.4 22.0 - 29.0 mmol/L    Calcium 9.9 8.6 - 10.5 mg/dL    Total Protein 7.6 6.0 - 8.5 g/dL    Albumin 4.90 3.50 - 5.20 g/dL    ALT (SGPT) 23 1 - 41 U/L    AST (SGOT) 21 1 - 40 U/L    Alkaline Phosphatase 44 39 - 117 U/L    Total Bilirubin 1.7 (H) 0.2 - 1.2 mg/dL    eGFR Non African Amer 101 >60 mL/min/1.73    Globulin 2.7 gm/dL    A/G Ratio 1.8 g/dL    BUN/Creatinine Ratio 15.1 7.0 - 25.0    Anion Gap 13.6 mmol/L   Lipid Panel    Specimen: Blood   Result Value Ref Range    Total Cholesterol 163 0 - 200 mg/dL    Triglycerides 198 (H) 0 - 150 mg/dL    HDL Cholesterol 38 (L) 40 - 60 mg/dL    LDL Cholesterol  85 0 - 100 mg/dL    VLDL Cholesterol 39.6 5 - 40 mg/dL    LDL/HDL Ratio 2.25    TSH Rfx On Abnormal To Free T4    Specimen: Blood   Result Value Ref Range    TSH 2.850 0.270 - 4.200 mIU/mL   HIV-1 / O / 2 Ag / Antibody 4th Generation    Specimen: Blood   Result Value Ref  Range    HIV-1/ HIV-2 Non-Reactive Non-Reactive   MicroAlbumin, Urine, Random - Urine, Clean Catch    Specimen: Urine, Clean Catch   Result Value Ref Range    Microalbumin, Urine <1.2 mg/L   TSPOT    Specimen: Blood   Result Value Ref Range    TSPOTTB Negative Negative    T-SPOT Panel A 0     T-SPOT Panel B 0     TSPOT NIL CONTROL INTERP Passed     TSPOT POS CONTROL INTERP Passed    CBC Auto Differential    Specimen: Blood   Result Value Ref Range    WBC 6.29 3.40 - 10.80 10*3/mm3    RBC 5.14 4.14 - 5.80 10*6/mm3    Hemoglobin 15.3 13.0 - 17.7 g/dL    Hematocrit 48.9 37.5 - 51.0 %    MCV 95.1 79.0 - 97.0 fL    MCH 29.8 26.6 - 33.0 pg    MCHC 31.3 (L) 31.5 - 35.7 g/dL    RDW 12.0 (L) 12.3 - 15.4 %    RDW-SD 42.5 37.0 - 54.0 fl    MPV 11.3 6.0 - 12.0 fL    Platelets 230 140 - 450 10*3/mm3    Neutrophil % 58.5 42.7 - 76.0 %    Lymphocyte % 30.0 19.6 - 45.3 %    Monocyte % 8.9 5.0 - 12.0 %    Eosinophil % 1.3 0.3 - 6.2 %    Basophil % 1.0 0.0 - 1.5 %    Immature Grans % 0.3 0.0 - 0.5 %    Neutrophils, Absolute 3.68 1.70 - 7.00 10*3/mm3    Lymphocytes, Absolute 1.89 0.70 - 3.10 10*3/mm3    Monocytes, Absolute 0.56 0.10 - 0.90 10*3/mm3    Eosinophils, Absolute 0.08 0.00 - 0.40 10*3/mm3    Basophils, Absolute 0.06 0.00 - 0.20 10*3/mm3    Immature Grans, Absolute 0.02 0.00 - 0.05 10*3/mm3    nRBC 0.0 0.0 - 0.2 /100 WBC       A/P    Problem List Items Addressed This Visit     None      Visit Diagnoses     Essential hypertension    -  Primary  -BP for me 148/92 on repeat, strong Fhx hypertension, also previously elevated at [facility] 6 months ago  -Start lisinpril 10 mg qd, discussed home monitoring  -RTC in 2 weeks for BP check and physical    Relevant Medications    lisinopril (PRINIVIL,ZESTRIL) 10 MG tablet    Rotator cuff syndrome of right shoulder      -Supraspinatus weakness, patient may have partial tear, I do not think this is full thickness  -Given chronic problem, recommend initial PT to which he is agreeable. He would  like to defer further imaging as well at this time. Consider MRI if not making progress with PT    Relevant Orders    Ambulatory Referral to Physical Therapy Evaluate and treat     Chronic right shoulder pain        Relevant Orders    Ambulatory Referral to Physical Therapy Evaluate and treat     Preventative health care        Relevant Orders    CBC (No Diff)    Comprehensive Metabolic Panel    Lipid Panel    TSH Rfx On Abnormal To Free T4    Urinalysis With Culture If Indicated - Urine, Clean Catch          Plan of care was reviewed with patient at the conclusion of today's visit. Education was provided regarding diagnoses, management, and the importance of keeping follow-up appointments. The patient was counseled regarding the risks, benefits, and possible side-effects of treatment. Patient and/or family express understanding and agreement with the management plan.        MICHELLE Magallanes

## 2021-08-12 ENCOUNTER — LAB (OUTPATIENT)
Dept: LAB | Facility: HOSPITAL | Age: 26
End: 2021-08-12

## 2021-08-12 DIAGNOSIS — Z00.00 PREVENTATIVE HEALTH CARE: ICD-10-CM

## 2021-08-12 LAB
ALBUMIN SERPL-MCNC: 4.7 G/DL (ref 3.5–5.2)
ALBUMIN/GLOB SERPL: 1.7 G/DL
ALP SERPL-CCNC: 46 U/L (ref 39–117)
ALT SERPL W P-5'-P-CCNC: 32 U/L (ref 1–41)
ANION GAP SERPL CALCULATED.3IONS-SCNC: 8.6 MMOL/L (ref 5–15)
AST SERPL-CCNC: 26 U/L (ref 1–40)
BILIRUB SERPL-MCNC: 1.2 MG/DL (ref 0–1.2)
BILIRUB UR QL STRIP: NEGATIVE
BUN SERPL-MCNC: 14 MG/DL (ref 6–20)
BUN/CREAT SERPL: 11.7 (ref 7–25)
CALCIUM SPEC-SCNC: 9.8 MG/DL (ref 8.6–10.5)
CHLORIDE SERPL-SCNC: 103 MMOL/L (ref 98–107)
CHOLEST SERPL-MCNC: 155 MG/DL (ref 0–200)
CLARITY UR: CLEAR
CO2 SERPL-SCNC: 27.4 MMOL/L (ref 22–29)
COLOR UR: ABNORMAL
CREAT SERPL-MCNC: 1.2 MG/DL (ref 0.76–1.27)
DEPRECATED RDW RBC AUTO: 39.4 FL (ref 37–54)
ERYTHROCYTE [DISTWIDTH] IN BLOOD BY AUTOMATED COUNT: 12.1 % (ref 12.3–15.4)
GFR SERPL CREATININE-BSD FRML MDRD: 74 ML/MIN/1.73
GLOBULIN UR ELPH-MCNC: 2.7 GM/DL
GLUCOSE SERPL-MCNC: 94 MG/DL (ref 65–99)
GLUCOSE UR STRIP-MCNC: NEGATIVE MG/DL
HCT VFR BLD AUTO: 45.8 % (ref 37.5–51)
HDLC SERPL-MCNC: 37 MG/DL (ref 40–60)
HGB BLD-MCNC: 15.3 G/DL (ref 13–17.7)
HGB UR QL STRIP.AUTO: NEGATIVE
KETONES UR QL STRIP: NEGATIVE
LDLC SERPL CALC-MCNC: 100 MG/DL (ref 0–100)
LDLC/HDLC SERPL: 2.65 {RATIO}
LEUKOCYTE ESTERASE UR QL STRIP.AUTO: NEGATIVE
MCH RBC QN AUTO: 30.3 PG (ref 26.6–33)
MCHC RBC AUTO-ENTMCNC: 33.4 G/DL (ref 31.5–35.7)
MCV RBC AUTO: 90.7 FL (ref 79–97)
NITRITE UR QL STRIP: NEGATIVE
PH UR STRIP.AUTO: 5.5 [PH] (ref 5–8)
PLATELET # BLD AUTO: 263 10*3/MM3 (ref 140–450)
PMV BLD AUTO: 11.1 FL (ref 6–12)
POTASSIUM SERPL-SCNC: 4.5 MMOL/L (ref 3.5–5.2)
PROT SERPL-MCNC: 7.4 G/DL (ref 6–8.5)
PROT UR QL STRIP: NEGATIVE
RBC # BLD AUTO: 5.05 10*6/MM3 (ref 4.14–5.8)
SODIUM SERPL-SCNC: 139 MMOL/L (ref 136–145)
SP GR UR STRIP: 1.03 (ref 1–1.03)
TRIGL SERPL-MCNC: 99 MG/DL (ref 0–150)
TSH SERPL DL<=0.05 MIU/L-ACNC: 1.97 UIU/ML (ref 0.27–4.2)
UROBILINOGEN UR QL STRIP: ABNORMAL
VLDLC SERPL-MCNC: 18 MG/DL (ref 5–40)
WBC # BLD AUTO: 6.45 10*3/MM3 (ref 3.4–10.8)

## 2021-08-12 PROCEDURE — 80053 COMPREHEN METABOLIC PANEL: CPT

## 2021-08-12 PROCEDURE — 80061 LIPID PANEL: CPT

## 2021-08-12 PROCEDURE — 81003 URINALYSIS AUTO W/O SCOPE: CPT

## 2021-08-12 PROCEDURE — 84443 ASSAY THYROID STIM HORMONE: CPT

## 2021-08-12 PROCEDURE — 85027 COMPLETE CBC AUTOMATED: CPT

## 2021-08-16 ENCOUNTER — OFFICE VISIT (OUTPATIENT)
Dept: FAMILY MEDICINE CLINIC | Facility: CLINIC | Age: 26
End: 2021-08-16

## 2021-08-16 VITALS
RESPIRATION RATE: 12 BRPM | TEMPERATURE: 98.8 F | HEIGHT: 71 IN | SYSTOLIC BLOOD PRESSURE: 132 MMHG | HEART RATE: 68 BPM | OXYGEN SATURATION: 98 % | BODY MASS INDEX: 36.01 KG/M2 | WEIGHT: 257.2 LBS | DIASTOLIC BLOOD PRESSURE: 78 MMHG

## 2021-08-16 DIAGNOSIS — I10 ESSENTIAL HYPERTENSION: ICD-10-CM

## 2021-08-16 DIAGNOSIS — R40.0 DAYTIME SOMNOLENCE: ICD-10-CM

## 2021-08-16 DIAGNOSIS — Z00.00 PREVENTATIVE HEALTH CARE: Primary | ICD-10-CM

## 2021-08-16 DIAGNOSIS — E66.09 CLASS 2 OBESITY DUE TO EXCESS CALORIES WITHOUT SERIOUS COMORBIDITY WITH BODY MASS INDEX (BMI) OF 36.0 TO 36.9 IN ADULT: ICD-10-CM

## 2021-08-16 DIAGNOSIS — L91.8 SKIN TAG: ICD-10-CM

## 2021-08-16 PROCEDURE — 99395 PREV VISIT EST AGE 18-39: CPT | Performed by: PHYSICIAN ASSISTANT

## 2021-08-16 RX ORDER — LISINOPRIL 10 MG/1
10 TABLET ORAL DAILY
Qty: 90 TABLET | Refills: 1 | Status: SHIPPED | OUTPATIENT
Start: 2021-08-16 | End: 2022-01-13 | Stop reason: SDUPTHER

## 2021-08-16 NOTE — PATIENT INSTRUCTIONS

## 2021-08-16 NOTE — PROGRESS NOTES
Reason for visit    Reid Leonardo is a 25 y.o. male who presents for annual comprehensive exam.    Chief Complaint   Patient presents with   • Annual Exam   • Hypertension       HPI     Here for physical. Hx hypertension, obesity.   BP at home 120-130/70-80 after recent start of lisinopril. Tolerating this well but does have dry cough but also some allergies lately. Labs 8/12/2021 show bump in creatinine to 1.2 from 0.9 in 2019.   Interrupted sleep, has started propping himself up which has helped. He does have daytime somnolence in the afternoon. Denies nonrestorative sleep, morning headaches. Does not notice apnea. Both parents have sleep apnea however.     Diet/Physical activity:  -Does not have consistent diet. Eats out fairly frequently.   -Minimal exercise currently. He has plans to start walking daily and to be more active    Immunizations:  -Unclear tetanus status, believes he had this through pediatrician  -Has been vaccinated for COVID-19    Cancer screening:   -Positive Fhx: spinal cancer (mother), brain tumor (maternal aunt), lung cancer (MGF - tobacco use)  -Negative Fhx: testicular, prostate, colon cancer, colon polyps, melanoma    Depression: PHQ-2 Depression Screening  Little interest or pleasure in doing things? 0   Feeling down, depressed, or hopeless? 0   PHQ-2 Total Score 0      Substance use:  -Smokes pipe 3 times weekly, intermittent use  -1 drink beer or whiskey/week  -Denies drug use  -1 pot coffee/day    Sexual health:  -Abstinent  -Denies hx of STIs    AAA screen:  -Not indicated    Dental/vision/skin:  -Current with dental and eye exams  -Has spot on neck he would like me to look at, present for several years and has not changed.      Past Medical History:   Diagnosis Date   • Fracture    • Headache    • Hypertension        History reviewed. No pertinent surgical history.    Family History   Problem Relation Age of Onset   • Arthritis Mother    • Cancer Mother    • Diabetes Mother     • Mental illness Mother    • Obesity Mother    • Arthritis Father    • Diabetes Father    • Hypertension Father    • Migraines Father    • Obesity Father    • Heart attack Maternal Grandfather    • Asthma Maternal Grandfather    • Asthma Maternal Grandmother        Social History     Socioeconomic History   • Marital status: Single     Spouse name: Not on file   • Number of children: Not on file   • Years of education: Not on file   • Highest education level: Not on file   Tobacco Use   • Smoking status: Never Smoker   • Smokeless tobacco: Never Used   Vaping Use   • Vaping Use: Never used   Substance and Sexual Activity   • Alcohol use: Yes     Comment: 2 per week   • Drug use: No   • Sexual activity: Defer       Allergies   Allergen Reactions   • Menthol Unknown (See Comments)     Breathing problem       ROS    Review of Systems   Constitutional: Negative for appetite change, chills, diaphoresis, fatigue, fever and unexpected weight loss.   HENT: Positive for congestion (nasal). Negative for hearing loss, postnasal drip, rhinorrhea, sore throat, swollen glands and trouble swallowing.    Eyes: Negative for blurred vision and visual disturbance.   Respiratory: Negative for cough, choking, shortness of breath and wheezing.    Cardiovascular: Negative for chest pain, palpitations and leg swelling.   Gastrointestinal: Negative for abdominal pain, blood in stool, constipation, diarrhea and GERD.   Endocrine: Negative for polydipsia and polyuria.   Genitourinary: Negative for difficulty urinating, dysuria, frequency, hematuria, nocturia, erectile dysfunction, scrotal swelling and testicular pain.   Musculoskeletal: Positive for arthralgias. Negative for myalgias.   Skin: Positive for skin lesions. Negative for rash.   Allergic/Immunologic: Positive for environmental allergies.   Neurological: Positive for headache (rare). Negative for dizziness, syncope and numbness.   Hematological: Negative for adenopathy.    Psychiatric/Behavioral: Positive for sleep disturbance. Negative for depressed mood. The patient is not nervous/anxious.        Vitals:    08/16/21 1024   BP: 132/78   Pulse: 68   Resp: 12   Temp: 98.8 °F (37.1 °C)   SpO2: 98%     Body mass index is 36.37 kg/m².      Current Outpatient Medications:   •  lisinopril (PRINIVIL,ZESTRIL) 10 MG tablet, Take 1 tablet by mouth Daily., Disp: 90 tablet, Rfl: 1    PE    Physical Exam  Vitals reviewed.   Constitutional:       General: He is not in acute distress.     Appearance: He is well-developed. He is obese.   HENT:      Head: Normocephalic and atraumatic.      Comments: Mallampati class III anatomy     Right Ear: Hearing and tympanic membrane normal.      Left Ear: Hearing and tympanic membrane normal.      Mouth/Throat:      Mouth: Mucous membranes are moist.      Dentition: Normal dentition.      Pharynx: Oropharynx is clear.   Eyes:      Extraocular Movements: Extraocular movements intact.      Conjunctiva/sclera: Conjunctivae normal.      Pupils: Pupils are equal, round, and reactive to light.      Comments:      Neck:      Thyroid: No thyroid mass or thyromegaly.      Vascular: No carotid bruit.   Cardiovascular:      Rate and Rhythm: Normal rate and regular rhythm.      Heart sounds: No murmur heard.   No friction rub.   Pulmonary:      Effort: Pulmonary effort is normal.      Breath sounds: Normal breath sounds.   Abdominal:      General: Bowel sounds are normal. There is no abdominal bruit.      Palpations: Abdomen is soft. There is no mass.      Tenderness: There is no abdominal tenderness.   Musculoskeletal:         General: Normal range of motion.      Cervical back: Normal range of motion and neck supple.   Lymphadenopathy:      Cervical: No cervical adenopathy.      Upper Body:      Right upper body: No supraclavicular adenopathy.      Left upper body: No supraclavicular adenopathy.   Skin:     General: Skin is warm and dry.      Findings: No rash.       Nails: There is no clubbing.             Comments: No suspicious nevi on clothed exam   Neurological:      Mental Status: He is alert.      Gait: Gait normal.      Deep Tendon Reflexes: Reflexes normal.   Psychiatric:         Speech: Speech normal.         Behavior: Behavior normal.         A/P    Problem List Items Addressed This Visit        Cardiac and Vasculature    Hypertension  -/82 for me on repeat, controlled  -Recent start lisinopril and bump in creatinine, repeat bmp in 4 weeks. Patient was given a printed order to have this completed and faxed to our office at that time  -Continue lisinopril at this time. If dry cough persists, call or return    Relevant Medications    lisinopril (PRINIVIL,ZESTRIL) 10 MG tablet    Other Relevant Orders    Basic Metabolic Panel       Endocrine and Metabolic    Class 2 obesity due to excess calories with body mass index (BMI) of 36.0 to 36.9 in adult  -Patient's Body mass index is 36.37 kg/m². indicating that he is obese (BMI >30). Obesity-related health conditions include the following: hypertension. Obesity is worsening. BMI is is above average; BMI management plan is completed. We discussed low calorie, low carb based diet program, portion control and increasing exercise.      Other Visit Diagnoses     Preventative health care    -  Primary  -Counseled patient regarding cancer screening, immunizations, healthy lifestyle, diet, maintaining a healthy weight, and exercise.   -Annual dental and eye exams were encouraged.  -Records release for pediatric immunizations today      Daytime somnolence      -Recommended sleep study given weight gain and recent diagnosis of hypertension.  Patient declines at this time and would like to work on weight reduction.  He is aware of the risks of untreated sleep apnea.  -Return to clinic in 6 months for weight and hypertension follow-up      Skin tag      -Reassured benign          Plan of care was reviewed with patient at the  conclusion of today's visit. Education was provided regarding diagnoses, management, treatment plan, and the importance of keeping follow-up appointments. The patient was counseled regarding the risks, benefits, and possible side-effects of treatment. Patient and/or family expresses understanding and agreement with the management plan.        MICHELLE Magallanes

## 2021-12-15 ENCOUNTER — OFFICE VISIT (OUTPATIENT)
Dept: FAMILY MEDICINE CLINIC | Facility: CLINIC | Age: 26
End: 2021-12-15

## 2021-12-15 VITALS
HEIGHT: 71 IN | SYSTOLIC BLOOD PRESSURE: 136 MMHG | OXYGEN SATURATION: 98 % | WEIGHT: 262 LBS | HEART RATE: 96 BPM | DIASTOLIC BLOOD PRESSURE: 82 MMHG | BODY MASS INDEX: 36.68 KG/M2

## 2021-12-15 DIAGNOSIS — L03.317 CELLULITIS OF BUTTOCK: Primary | ICD-10-CM

## 2021-12-15 PROCEDURE — 90686 IIV4 VACC NO PRSV 0.5 ML IM: CPT | Performed by: PHYSICIAN ASSISTANT

## 2021-12-15 PROCEDURE — 99213 OFFICE O/P EST LOW 20 MIN: CPT | Performed by: PHYSICIAN ASSISTANT

## 2021-12-15 PROCEDURE — 90471 IMMUNIZATION ADMIN: CPT | Performed by: PHYSICIAN ASSISTANT

## 2021-12-15 RX ORDER — CEPHALEXIN 500 MG/1
CAPSULE ORAL
COMMUNITY
Start: 2021-11-02 | End: 2021-12-15

## 2021-12-15 RX ORDER — SULFAMETHOXAZOLE AND TRIMETHOPRIM 800; 160 MG/1; MG/1
1 TABLET ORAL 2 TIMES DAILY
Qty: 14 TABLET | Refills: 0 | Status: SHIPPED | OUTPATIENT
Start: 2021-12-15 | End: 2022-08-18

## 2021-12-15 RX ORDER — CEPHALEXIN 500 MG/1
500 CAPSULE ORAL 3 TIMES DAILY
Qty: 21 CAPSULE | Refills: 0 | Status: SHIPPED | OUTPATIENT
Start: 2021-12-15 | End: 2021-12-22

## 2021-12-15 NOTE — PROGRESS NOTES
"    Chief Complaint   Patient presents with   • Cyst     Next to the tailbone. Pt states had a procedure in November and drained fluid on it. Pt states has been having pain with it yesterday with slight redness arounf the area       HPI     Reid Leonardo is a pleasant 26 y.o. male who presents for evaluation of \"chief complaint.\" Patient c/o a gluteal abscess that became painful yesterday. It has not drained. He states this was I&D'd at an ER in [place] on 11/2/21. He was treated with keflex and symptoms improved at that time.     Past Medical History:   Diagnosis Date   • Fracture    • Headache    • Hypertension        History reviewed. No pertinent surgical history.    Family History   Problem Relation Age of Onset   • Arthritis Mother    • Cancer Mother    • Diabetes Mother    • Mental illness Mother    • Obesity Mother    • Arthritis Father    • Diabetes Father    • Hypertension Father    • Migraines Father    • Obesity Father    • Heart attack Maternal Grandfather    • Asthma Maternal Grandfather    • Asthma Maternal Grandmother        Social History     Socioeconomic History   • Marital status: Single   Tobacco Use   • Smoking status: Never Smoker   • Smokeless tobacco: Never Used   Vaping Use   • Vaping Use: Never used   Substance and Sexual Activity   • Alcohol use: Yes     Comment: 2 per week   • Drug use: No   • Sexual activity: Defer       Allergies   Allergen Reactions   • Menthol Unknown (See Comments)     Breathing problem       ROS    Review of Systems   Constitutional: Negative for chills and fever.   Skin: Positive for skin lesions.       Vitals:    12/15/21 1107   BP: 136/82   Pulse: 96   SpO2: 98%     Body mass index is 37.05 kg/m².      Current Outpatient Medications:   •  lisinopril (PRINIVIL,ZESTRIL) 10 MG tablet, Take 1 tablet by mouth Daily., Disp: 90 tablet, Rfl: 1  •  NAPROXEN PO, Take 200 mg by mouth 2 (Two) Times a Day., Disp: , Rfl:   •  cephalexin (KEFLEX) 500 MG capsule, Take 1 " capsule by mouth 3 (Three) Times a Day for 7 days., Disp: 21 capsule, Rfl: 0  •  sulfamethoxazole-trimethoprim (BACTRIM DS,SEPTRA DS) 800-160 MG per tablet, Take 1 tablet by mouth 2 (Two) Times a Day., Disp: 14 tablet, Rfl: 0    PE    Physical Exam  Vitals reviewed.   Constitutional:       General: He is not in acute distress.  Pulmonary:      Effort: Pulmonary effort is normal. No respiratory distress.   Skin:         Neurological:      Mental Status: He is alert.   Psychiatric:         Mood and Affect: Mood normal.          A/P    Problem List Items Addressed This Visit     None      Visit Diagnoses     Cellulitis of buttock    -  Primary  -No abscess present on exam  -Treat with keflex and bactrim, warm compresses  -Counseled patient to return if symptoms worsen or persist  -If he has severe pain, I recommend ER evaluation          Plan of care was reviewed with patient at the conclusion of today's visit. Education was provided regarding diagnoses, management, prescribed or recommended OTC products, and the importance of compliance with follow-up appointments. The patient was counseled regarding the risks, benefits, and possible side-effects of treatment. I advised the patient to keep me informed of any acute changes in their status including new, worsening, or persistent symptoms. Patient expresses understanding and agreement with the management plan.        MICHELLE Magallanes

## 2022-01-14 RX ORDER — LISINOPRIL 10 MG/1
10 TABLET ORAL DAILY
Qty: 90 TABLET | Refills: 1 | Status: SHIPPED | OUTPATIENT
Start: 2022-01-14 | End: 2022-04-22

## 2022-01-14 NOTE — TELEPHONE ENCOUNTER
Rx Refill Note  Requested Prescriptions     Pending Prescriptions Disp Refills   • lisinopril (PRINIVIL,ZESTRIL) 10 MG tablet 90 tablet 1     Sig: Take 1 tablet by mouth Daily.      Last office visit with prescribing clinician: 12/15/2021      Next office visit with prescribing clinician: Visit date not found            Polly Lazo MA  01/14/22, 08:29 EST

## 2022-04-22 RX ORDER — LISINOPRIL 10 MG/1
TABLET ORAL
Qty: 60 TABLET | Refills: 0 | Status: SHIPPED | OUTPATIENT
Start: 2022-04-22 | End: 2022-04-29 | Stop reason: SDUPTHER

## 2022-04-29 ENCOUNTER — OFFICE VISIT (OUTPATIENT)
Dept: FAMILY MEDICINE CLINIC | Facility: CLINIC | Age: 27
End: 2022-04-29

## 2022-04-29 VITALS
TEMPERATURE: 97.7 F | BODY MASS INDEX: 35.7 KG/M2 | WEIGHT: 255 LBS | DIASTOLIC BLOOD PRESSURE: 84 MMHG | RESPIRATION RATE: 14 BRPM | SYSTOLIC BLOOD PRESSURE: 120 MMHG | HEIGHT: 71 IN | HEART RATE: 85 BPM | OXYGEN SATURATION: 96 %

## 2022-04-29 DIAGNOSIS — Z71.84 TRAVEL ADVICE ENCOUNTER: ICD-10-CM

## 2022-04-29 DIAGNOSIS — R40.0 DAYTIME SOMNOLENCE: Primary | ICD-10-CM

## 2022-04-29 PROCEDURE — 90471 IMMUNIZATION ADMIN: CPT | Performed by: PHYSICIAN ASSISTANT

## 2022-04-29 PROCEDURE — 90632 HEPA VACCINE ADULT IM: CPT | Performed by: PHYSICIAN ASSISTANT

## 2022-04-29 PROCEDURE — 99213 OFFICE O/P EST LOW 20 MIN: CPT | Performed by: PHYSICIAN ASSISTANT

## 2022-04-29 RX ORDER — LISINOPRIL 10 MG/1
10 TABLET ORAL DAILY
Qty: 90 TABLET | Refills: 1 | Status: SHIPPED | OUTPATIENT
Start: 2022-04-29 | End: 2022-08-18 | Stop reason: SDUPTHER

## 2022-04-29 RX ORDER — LORATADINE 10 MG/1
10 TABLET ORAL DAILY
COMMUNITY
End: 2023-03-03

## 2022-04-29 RX ORDER — SALMONELLA TYPHI TY2 VI POLYSACCHARIDE ANTIGEN 25 UG/.5ML
0.5 INJECTION, SOLUTION INTRAMUSCULAR ONCE
Qty: 0.5 ML | Refills: 0 | Status: SHIPPED | OUTPATIENT
Start: 2022-04-29 | End: 2022-04-29

## 2022-04-29 NOTE — PROGRESS NOTES
"    Chief Complaint   Patient presents with   • Hypertension     6 month f/u   • sleep study order   • wants tdap rx       HPI     Reid Leonardo is a pleasant 26 y.o. male who presents for evaluation of \"chief complaint.\"   Patient c/o increased fatigue and daytime somnolence the last few months. Symptoms worsened when he changed beds to sleeping flat rather than inclined. He drifts off to sleep if he doesn't do anything for 4-5 minutes. Both parents have sleep apnea. He will be in [facility] from 5/29 to early August and asks about immunizations.     Past Medical History:   Diagnosis Date   • Arthritis    • Fracture    • Headache    • Hypertension        History reviewed. No pertinent surgical history.    Family History   Problem Relation Age of Onset   • Arthritis Mother    • Cancer Mother    • Diabetes Mother    • Mental illness Mother    • Obesity Mother    • Depression Mother    • Miscarriages / Stillbirths Mother    • Arthritis Father    • Diabetes Father    • Hypertension Father    • Migraines Father    • Obesity Father    • Heart attack Maternal Grandfather    • Asthma Maternal Grandfather    • Cancer Maternal Grandfather    • Asthma Maternal Grandmother    • Depression Sister    • Miscarriages / Stillbirths Sister        Social History     Socioeconomic History   • Marital status: Single   Tobacco Use   • Smoking status: Current Some Day Smoker     Packs/day: 0.25     Years: 8.00     Pack years: 2.00     Types: Pipe     Start date: 5/30/2014   • Smokeless tobacco: Never Used   Vaping Use   • Vaping Use: Never used   Substance and Sexual Activity   • Alcohol use: Yes     Alcohol/week: 3.0 standard drinks     Types: 2 Cans of beer, 1 Drinks containing 0.5 oz of alcohol per week     Comment: 2 per week   • Drug use: No   • Sexual activity: Never       Allergies   Allergen Reactions   • Menthol Unknown (See Comments)     Breathing problem       ROS    Review of Systems   Constitutional: Positive for " fatigue.   Respiratory: Negative for apnea.    Neurological: Negative for headache.       Vitals:    04/29/22 1513   BP: 120/84   Pulse: 85   Resp: 14   Temp: 97.7 °F (36.5 °C)   SpO2: 96%     Body mass index is 36.06 kg/m².      Current Outpatient Medications:   •  lisinopril (PRINIVIL,ZESTRIL) 10 MG tablet, Take 1 tablet by mouth Daily., Disp: 90 tablet, Rfl: 1  •  loratadine (CLARITIN) 10 MG tablet, Take 10 mg by mouth Daily., Disp: , Rfl:   •  NAPROXEN PO, Take 200 mg by mouth 2 (Two) Times a Day., Disp: , Rfl:   •  sulfamethoxazole-trimethoprim (BACTRIM DS,SEPTRA DS) 800-160 MG per tablet, Take 1 tablet by mouth 2 (Two) Times a Day., Disp: 14 tablet, Rfl: 0  •  Tdap (BOOSTRIX) 5-2.5-18.5 LF-MCG/0.5 injection, Inject 0.5 mL into the appropriate muscle as directed by prescriber 1 (One) Time for 1 dose., Disp: 0.5 mL, Rfl: 0  •  typhoid polysaccharide (Typhim VI) 25 MCG/0.5ML injection, Inject 0.5 mL into the appropriate muscle as directed by prescriber 1 (One) Time for 1 dose., Disp: 0.5 mL, Rfl: 0    PE    Physical Exam  Vitals reviewed.   Constitutional:       General: He is not in acute distress.  Pulmonary:      Effort: Pulmonary effort is normal. No respiratory distress.   Neurological:      Mental Status: He is alert.   Psychiatric:         Mood and Affect: Mood normal.          A/P    Problem List Items Addressed This Visit    None     Visit Diagnoses     Daytime somnolence    -  Primary  -High suspicion for sleep apnea  -Refer to sleep medicine    Relevant Orders    Ambulatory Referral to Sleep Medicine    Travel advice encounter      -Traveling to [facility]   -Orders for Tdap and Typhoid vaccine provided. Advised to receive them from the pharmacy  -Hepatitis A vaccine given today          Plan of care was reviewed with patient at the conclusion of today's visit. Education was provided regarding diagnoses, management, prescribed or recommended OTC products, and the importance of compliance with follow-up  appointments. The patient was counseled regarding the risks, benefits, and possible side-effects of treatment. I advised the patient to keep me informed of any acute changes in their status including new, worsening, or persistent symptoms. Patient expresses understanding and agreement with the management plan.        MICHELLE Magallanes  Answers for HPI/ROS submitted by the patient on 4/22/2022  What is the primary reason for your visit?: Other  Please describe your symptoms.: Constantly tired, unrestful sleep  Have you had these symptoms before?: No  How long have you been having these symptoms?: Greater than 2 weeks  Please describe any probable cause for these symptoms. : Family history of sleep apnea

## 2022-08-11 ENCOUNTER — TELEPHONE (OUTPATIENT)
Dept: FAMILY MEDICINE CLINIC | Facility: CLINIC | Age: 27
End: 2022-08-11

## 2022-08-11 ENCOUNTER — OFFICE VISIT (OUTPATIENT)
Dept: SLEEP MEDICINE | Facility: HOSPITAL | Age: 27
End: 2022-08-11

## 2022-08-11 VITALS
HEART RATE: 82 BPM | WEIGHT: 243 LBS | BODY MASS INDEX: 36.83 KG/M2 | DIASTOLIC BLOOD PRESSURE: 68 MMHG | OXYGEN SATURATION: 97 % | SYSTOLIC BLOOD PRESSURE: 118 MMHG | HEIGHT: 68 IN

## 2022-08-11 DIAGNOSIS — I10 ESSENTIAL HYPERTENSION: ICD-10-CM

## 2022-08-11 DIAGNOSIS — G47.19 EXCESSIVE DAYTIME SLEEPINESS: ICD-10-CM

## 2022-08-11 DIAGNOSIS — R29.818 SUSPECTED SLEEP APNEA: Primary | ICD-10-CM

## 2022-08-11 PROCEDURE — 99203 OFFICE O/P NEW LOW 30 MIN: CPT | Performed by: NURSE PRACTITIONER

## 2022-08-11 NOTE — PROGRESS NOTES
Chief Complaint:   Chief Complaint   Patient presents with   • Sleeping Problem       HPI:    Reid Leonardo is a 26 y.o. male here to establish care.  He is referred by his primary care provider MICHELLE Magallanes.  Patient has a history of hypertension, seasonal allergies, and class II obesity.  He has a 1 to 2-year history of daytime sleepiness, gasping frequent awakenings, and nonrestorative sleep, leg cramps, nightmares, difficulty falling and staying asleep.  Patient states he does move his legs around a lot at night and this sometimes keeps him from going to sleep.  He does have nightmares at times as well as vivid dreams.  He denies hypnagogic hallucinations and sleep paralysis.  Patient denies nasal fractures he did have 2 concussions as a child..  He has an [place] score of 4/24.    During the weekday patient will go to bed around 830 and get up at 3 AM weekend going to bed at 9:30 PM and awakening at 5 AM.  He does estimate getting approximately 7 hours of sleep nightly.  He will go to sleep within 10 to 60 minutes and does not feel rested upon awakening.  He will usually take a 1 to 2-hour nap nightly.  We did discuss today the consequences of untreated sleep apnea as well as different therapies available to him and he does not wish to move forward with sleep testing.    Social history  26-year-old  male.  Patient is a student.  He is in the [employer] he does smoke 1 pipe or cigar daily.  He will drink 2-3 times a week usually 1 or 2 drinks in each setting he will have either beer or liquor.  Patient does drink 12 cups of regular coffee daily and has an occasional tea drinker.    Past Medical History:   Diagnosis Date   • Arthritis    • Fracture    • Headache    • Hypertension        Family History   Problem Relation Age of Onset   • Sleep disorder Mother    • Arthritis Mother    • Cancer Mother    • Diabetes Mother    • Mental illness Mother    • Obesity Mother    • Depression Mother    •  Miscarriages / Stillbirths Mother    • Sleep disorder Father    • Arthritis Father    • Diabetes Father    • Hypertension Father    • Migraines Father    • Obesity Father    • Depression Sister    • Miscarriages / Stillbirths Sister    • Asthma Maternal Grandmother    • Heart attack Maternal Grandfather    • Asthma Maternal Grandfather    • Cancer Maternal Grandfather      History reviewed. No pertinent surgical history.      Current medications are:   Current Outpatient Medications:   •  lisinopril (PRINIVIL,ZESTRIL) 10 MG tablet, Take 1 tablet by mouth Daily., Disp: 90 tablet, Rfl: 1  •  loratadine (CLARITIN) 10 MG tablet, Take 10 mg by mouth Daily., Disp: , Rfl:   •  NAPROXEN PO, Take 200 mg by mouth 2 (Two) Times a Day., Disp: , Rfl:   •  sulfamethoxazole-trimethoprim (BACTRIM DS,SEPTRA DS) 800-160 MG per tablet, Take 1 tablet by mouth 2 (Two) Times a Day., Disp: 14 tablet, Rfl: 0.      The patient's relevant past medical, surgical, family and social history were reviewed and updated in Epic as appropriate.       Review of Systems   Constitutional: Positive for fatigue.   Eyes: Positive for visual disturbance.   Musculoskeletal: Positive for arthralgias.   Allergic/Immunologic: Positive for environmental allergies and food allergies.   Neurological: Positive for tremors and headaches.   Psychiatric/Behavioral: Positive for sleep disturbance.   All other systems reviewed and are negative.        Objective:    Physical Exam  Constitutional:       Appearance: Normal appearance.   HENT:      Head: Normocephalic and atraumatic.      Mouth/Throat:      Mouth: Mucous membranes are moist.      Pharynx: Oropharynx is clear.      Comments: Mallampati 4 anatomy  Cardiovascular:      Rate and Rhythm: Normal rate and regular rhythm.   Pulmonary:      Effort: Pulmonary effort is normal.      Breath sounds: Normal breath sounds.   Skin:     General: Skin is warm and dry.   Neurological:      Mental Status: He is alert and  oriented to person, place, and time.   Psychiatric:         Mood and Affect: Mood normal.         Behavior: Behavior normal.         Thought Content: Thought content normal.         Judgment: Judgment normal.              ASSESSMENT/PLAN    Diagnoses and all orders for this visit:    1. Suspected sleep apnea (Primary)  -     Home Sleep Study; Future    2. Excessive daytime sleepiness  -     Home Sleep Study; Future    3. Essential hypertension  -     Home Sleep Study; Future        1. Counseled patient regarding multimodal approach with healthy nutrition, healthy sleep, regular physical activity, social activities, counseling, and medications. Encouraged to practice lateral sleep position. Avoid alcohol and sedatives close to bedtime.  2.   Patient does have strong suspicion symptoms for sleep apnea we will move forward with home sleep study and follow-up accordingly.    I have reviewed the results of my evaluation and impression and discussed my recommendations in detail with the patient.      Signed by  RADHA Mcdermott    August 11, 2022      CC: Alfredo Gupta PA         No ref. provider found

## 2022-08-11 NOTE — TELEPHONE ENCOUNTER
Pt contacted office requesting an antibody blood test for COVID to be cleared for work. Pt is requesting PCP order the lab so he can have completed. Pt requesting call if PCP is able to order lab.

## 2022-08-12 DIAGNOSIS — Z76.89 RETURN TO WORK EVALUATION: Primary | ICD-10-CM

## 2022-08-12 NOTE — TELEPHONE ENCOUNTER
Usually an antibody test is not required for return to work. Is he certain a negative COVID test is not what is required? The antibody test would be a blood draw.

## 2022-08-12 NOTE — TELEPHONE ENCOUNTER
Contacted patient reports he need both antibody testing as well as a negative covid test to present to his employer

## 2022-08-16 ENCOUNTER — LAB (OUTPATIENT)
Dept: LAB | Facility: HOSPITAL | Age: 27
End: 2022-08-16

## 2022-08-16 ENCOUNTER — CLINICAL SUPPORT (OUTPATIENT)
Dept: FAMILY MEDICINE CLINIC | Facility: CLINIC | Age: 27
End: 2022-08-16

## 2022-08-16 DIAGNOSIS — I10 ESSENTIAL HYPERTENSION: ICD-10-CM

## 2022-08-16 DIAGNOSIS — Z76.89 RETURN TO WORK EVALUATION: ICD-10-CM

## 2022-08-16 DIAGNOSIS — Z76.89 RETURN TO WORK EVALUATION: Primary | ICD-10-CM

## 2022-08-16 PROCEDURE — U0004 COV-19 TEST NON-CDC HGH THRU: HCPCS | Performed by: PHYSICIAN ASSISTANT

## 2022-08-16 PROCEDURE — 80048 BASIC METABOLIC PNL TOTAL CA: CPT

## 2022-08-16 PROCEDURE — 86769 SARS-COV-2 COVID-19 ANTIBODY: CPT

## 2022-08-17 LAB
ANION GAP SERPL CALCULATED.3IONS-SCNC: 9.1 MMOL/L (ref 5–15)
BUN SERPL-MCNC: 13 MG/DL (ref 6–20)
BUN/CREAT SERPL: 11.5 (ref 7–25)
CALCIUM SPEC-SCNC: 9.1 MG/DL (ref 8.6–10.5)
CHLORIDE SERPL-SCNC: 103 MMOL/L (ref 98–107)
CO2 SERPL-SCNC: 26.9 MMOL/L (ref 22–29)
CREAT SERPL-MCNC: 1.13 MG/DL (ref 0.76–1.27)
EGFRCR SERPLBLD CKD-EPI 2021: 91.9 ML/MIN/1.73
GLUCOSE SERPL-MCNC: 88 MG/DL (ref 65–99)
POTASSIUM SERPL-SCNC: 4.7 MMOL/L (ref 3.5–5.2)
SARS-COV-2 RNA NOSE QL NAA+PROBE: NOT DETECTED
SODIUM SERPL-SCNC: 139 MMOL/L (ref 136–145)

## 2022-08-18 ENCOUNTER — OFFICE VISIT (OUTPATIENT)
Dept: FAMILY MEDICINE CLINIC | Facility: CLINIC | Age: 27
End: 2022-08-18

## 2022-08-18 ENCOUNTER — LAB (OUTPATIENT)
Dept: LAB | Facility: HOSPITAL | Age: 27
End: 2022-08-18

## 2022-08-18 VITALS
OXYGEN SATURATION: 97 % | TEMPERATURE: 97.1 F | WEIGHT: 245 LBS | BODY MASS INDEX: 37.13 KG/M2 | SYSTOLIC BLOOD PRESSURE: 118 MMHG | DIASTOLIC BLOOD PRESSURE: 84 MMHG | HEIGHT: 68 IN | HEART RATE: 85 BPM

## 2022-08-18 DIAGNOSIS — Z00.00 PREVENTATIVE HEALTH CARE: ICD-10-CM

## 2022-08-18 DIAGNOSIS — E66.09 CLASS 2 OBESITY DUE TO EXCESS CALORIES WITHOUT SERIOUS COMORBIDITY WITH BODY MASS INDEX (BMI) OF 37.0 TO 37.9 IN ADULT: ICD-10-CM

## 2022-08-18 DIAGNOSIS — Z00.00 PREVENTATIVE HEALTH CARE: Primary | ICD-10-CM

## 2022-08-18 DIAGNOSIS — I10 PRIMARY HYPERTENSION: ICD-10-CM

## 2022-08-18 DIAGNOSIS — M54.2 NECK PAIN ON RIGHT SIDE: ICD-10-CM

## 2022-08-18 LAB
ALBUMIN SERPL-MCNC: 4.9 G/DL (ref 3.5–5.2)
ALP SERPL-CCNC: 44 U/L (ref 39–117)
ALT SERPL W P-5'-P-CCNC: 28 U/L (ref 1–41)
AST SERPL-CCNC: 24 U/L (ref 1–40)
BILIRUB CONJ SERPL-MCNC: <0.2 MG/DL (ref 0–0.3)
BILIRUB INDIRECT SERPL-MCNC: NORMAL MG/DL
BILIRUB SERPL-MCNC: 1.1 MG/DL (ref 0–1.2)
CHOLEST SERPL-MCNC: 172 MG/DL (ref 0–200)
DEPRECATED RDW RBC AUTO: 38.8 FL (ref 37–54)
ERYTHROCYTE [DISTWIDTH] IN BLOOD BY AUTOMATED COUNT: 11.9 % (ref 12.3–15.4)
HCT VFR BLD AUTO: 47.4 % (ref 37.5–51)
HCV AB SER DONR QL: NORMAL
HDLC SERPL-MCNC: 47 MG/DL (ref 40–60)
HGB BLD-MCNC: 15.9 G/DL (ref 13–17.7)
LDLC SERPL CALC-MCNC: 108 MG/DL (ref 0–100)
LDLC/HDLC SERPL: 2.28 {RATIO}
MCH RBC QN AUTO: 30 PG (ref 26.6–33)
MCHC RBC AUTO-ENTMCNC: 33.5 G/DL (ref 31.5–35.7)
MCV RBC AUTO: 89.4 FL (ref 79–97)
PLATELET # BLD AUTO: 223 10*3/MM3 (ref 140–450)
PMV BLD AUTO: 11.2 FL (ref 6–12)
PROT SERPL-MCNC: 7.2 G/DL (ref 6–8.5)
RBC # BLD AUTO: 5.3 10*6/MM3 (ref 4.14–5.8)
SARS-COV-2 AB SERPL QL IA: POSITIVE
TRIGL SERPL-MCNC: 90 MG/DL (ref 0–150)
TSH SERPL DL<=0.05 MIU/L-ACNC: 3.24 UIU/ML (ref 0.27–4.2)
VLDLC SERPL-MCNC: 17 MG/DL (ref 5–40)
WBC NRBC COR # BLD: 6.65 10*3/MM3 (ref 3.4–10.8)

## 2022-08-18 PROCEDURE — 80076 HEPATIC FUNCTION PANEL: CPT

## 2022-08-18 PROCEDURE — 99395 PREV VISIT EST AGE 18-39: CPT | Performed by: PHYSICIAN ASSISTANT

## 2022-08-18 PROCEDURE — 80061 LIPID PANEL: CPT

## 2022-08-18 PROCEDURE — 85027 COMPLETE CBC AUTOMATED: CPT

## 2022-08-18 PROCEDURE — 84443 ASSAY THYROID STIM HORMONE: CPT

## 2022-08-18 PROCEDURE — 86803 HEPATITIS C AB TEST: CPT

## 2022-08-18 RX ORDER — LISINOPRIL 10 MG/1
10 TABLET ORAL DAILY
Qty: 90 TABLET | Refills: 3 | Status: SHIPPED | OUTPATIENT
Start: 2022-08-18 | End: 2022-08-18

## 2022-08-18 RX ORDER — LISINOPRIL 10 MG/1
10 TABLET ORAL DAILY
Qty: 30 TABLET | Refills: 11 | Status: SHIPPED | OUTPATIENT
Start: 2022-08-18

## 2022-08-18 NOTE — PROGRESS NOTES
Reason for visit    Reid Leonardo is a 26 y.o. male who presents for annual comprehensive exam.    Chief Complaint   Patient presents with   • Annual Exam   • Hypertension   • wants pcv vaccine       HPI     Here for physical.   Recently returned from 10 week stay [facility].  He is in the process of getting a sleep study.  He mentions some discomfort under his right jaw for about 3 days but does not have it today. He thinks this may have been caused by falling asleep on his plastic collar.    Diet/Physical activity:  -Patient reports healthy diet over the summer.   -He did do a lot of walking this summer  -Down 12 pounds from last year    Immunizations:  -Declines pneumococcal vaccine    Cancer screening:   -Positive Fhx: spinal cancer (mother), brain tumor (maternal aunt), lung cancer (MGF - tobacco use)  -Negative Fhx: testicular, prostate, colon cancer, colon polyps, melanoma    Depression: PHQ-2 Depression Screening  -Negative    Substance use:  -He smokes a pipe and cigars intermittently  -Denies recreational drug use  -2-3 beers per week on average  -12 cups of coffee daily    Sexual health:  -Not sexually active    Dental/vision/skin:  -Due for eye exam  -Overdue for dental exam  -Denies new, changing or concerning skin lesions    Past Medical History:   Diagnosis Date   • Arthritis    • Fracture    • Headache    • Hypertension        History reviewed. No pertinent surgical history.    Family History   Problem Relation Age of Onset   • Sleep disorder Mother    • Arthritis Mother    • Cancer Mother    • Diabetes Mother    • Mental illness Mother    • Obesity Mother    • Depression Mother    • Miscarriages / Stillbirths Mother    • Sleep disorder Father    • Arthritis Father    • Diabetes Father    • Hypertension Father    • Migraines Father    • Obesity Father    • Depression Sister    • Miscarriages / Stillbirths Sister    • Asthma Maternal Grandmother    • Heart attack Maternal Grandfather    • Asthma  Maternal Grandfather    • Cancer Maternal Grandfather    • Lung cancer Maternal Grandfather        Social History     Socioeconomic History   • Marital status: Single   Tobacco Use   • Smoking status: Current Some Day Smoker     Packs/day: 0.25     Years: 8.00     Pack years: 2.00     Types: Pipe     Start date: 5/30/2014   • Smokeless tobacco: Never Used   Vaping Use   • Vaping Use: Never used   Substance and Sexual Activity   • Alcohol use: Yes     Alcohol/week: 3.0 standard drinks     Types: 2 Cans of beer, 1 Drinks containing 0.5 oz of alcohol per week     Comment: 2 per week   • Drug use: No   • Sexual activity: Never       Allergies   Allergen Reactions   • Menthol Unknown (See Comments)     Breathing problem       ROS    Review of Systems   Constitutional: Positive for fatigue. Negative for appetite change, diaphoresis, fever and unexpected weight loss.   HENT: Negative for congestion, hearing loss, sore throat and trouble swallowing.    Eyes: Negative for blurred vision and visual disturbance.   Respiratory: Positive for apnea. Negative for cough and shortness of breath.    Cardiovascular: Negative for chest pain.   Gastrointestinal: Negative for abdominal pain, constipation, diarrhea and GERD.   Endocrine: Negative for polydipsia and polyuria.   Genitourinary: Negative for difficulty urinating, dysuria, hematuria, nocturia, scrotal swelling and testicular pain.   Musculoskeletal: Positive for arthralgias (knees, right shoulder (chronic)).   Skin: Negative for rash and skin lesions.   Neurological: Positive for headache. Negative for dizziness and numbness.   Psychiatric/Behavioral: Positive for sleep disturbance. Negative for depressed mood. The patient is not nervous/anxious.        Vitals:    08/18/22 0901   BP: 118/84   Pulse: 85   Temp: 97.1 °F (36.2 °C)   SpO2: 97%     Body mass index is 37.53 kg/m².      Current Outpatient Medications:   •  lisinopril (PRINIVIL,ZESTRIL) 10 MG tablet, Take 1 tablet by  mouth Daily., Disp: 30 tablet, Rfl: 11  •  loratadine (CLARITIN) 10 MG tablet, Take 10 mg by mouth Daily., Disp: , Rfl:   •  NAPROXEN PO, Take 200 mg by mouth 2 (Two) Times a Day., Disp: , Rfl:     PE    Physical Exam  Vitals reviewed.   Constitutional:       General: He is not in acute distress.     Appearance: He is well-developed. He is obese.   HENT:      Head: Normocephalic and atraumatic.      Right Ear: Hearing and tympanic membrane normal.      Left Ear: Hearing and tympanic membrane normal.      Mouth/Throat:      Mouth: Mucous membranes are moist.      Dentition: Normal dentition.      Pharynx: Oropharynx is clear.   Eyes:      Extraocular Movements: Extraocular movements intact.      Conjunctiva/sclera: Conjunctivae normal.      Pupils: Pupils are equal, round, and reactive to light.      Comments:      Neck:      Thyroid: No thyroid mass or thyromegaly.      Vascular: No carotid bruit.     Cardiovascular:      Rate and Rhythm: Normal rate and regular rhythm.      Heart sounds: No murmur heard.    No friction rub.   Pulmonary:      Effort: Pulmonary effort is normal.      Breath sounds: Normal breath sounds.   Chest:   Breasts:      Right: No supraclavicular adenopathy.      Left: No supraclavicular adenopathy.       Abdominal:      General: Bowel sounds are normal. There is no abdominal bruit.      Palpations: Abdomen is soft. There is no mass.      Tenderness: There is no abdominal tenderness.   Musculoskeletal:         General: Normal range of motion.      Cervical back: Normal range of motion and neck supple.   Lymphadenopathy:      Cervical: No cervical adenopathy.      Upper Body:      Right upper body: No supraclavicular adenopathy.      Left upper body: No supraclavicular adenopathy.   Skin:     General: Skin is warm and dry.      Findings: No rash.      Nails: There is no clubbing.      Comments: No suspicious nevi   Neurological:      Mental Status: He is alert.      Gait: Gait normal.      Deep  Tendon Reflexes: Reflexes normal.   Psychiatric:         Speech: Speech normal.         Behavior: Behavior normal.         A/P    Problem List Items Addressed This Visit        Cardiac and Vasculature    Hypertension  -Well-controlled  -Continue lisinopril  -Return to clinic in 6 months for blood pressure check    Relevant Medications    lisinopril (PRINIVIL,ZESTRIL) 10 MG tablet       Endocrine and Metabolic    Class 2 obesity due to excess calories with body mass index (BMI) of 37.0 to 37.9 in adult  -Class 2 Severe Obesity (BMI >=35 and <=39.9). Obesity-related health conditions include the following: obstructive sleep apnea. Obesity is improving with lifestyle modifications. BMI is is above average; BMI management plan is completed. We discussed low calorie, low carb based diet program, portion control and increasing exercise.      Other Visit Diagnoses     Preventative health care    -  Primary  -Counseled patient regarding cancer screening, immunizations, healthy lifestyle, diet, maintaining a healthy weight, and exercise.   -Annual dental and eye exams were encouraged.  -Declines pneumococcal vaccine today after discussion  -Encourage patient to avoid tobacco use  -Check labs as ordered    Relevant Orders    CBC (No Diff)    Lipid Panel    TSH Rfx On Abnormal To Free T4    Hepatic Function Panel    Hepatitis C Antibody    Neck pain on right side      -He has some mild soft tissue tenderness on exam  -May be related to muscle strain and this is improving per the patient  -He was counseled to return if symptoms persist or worsen          Plan of care was reviewed with patient at the conclusion of today's visit. Education was provided regarding diagnoses, management, treatment plan, and the importance of keeping follow-up appointments. The patient was counseled regarding the risks, benefits, and possible side-effects of treatment. Patient and/or family expresses understanding and agreement with the management  plan.        MICHELLE Magallanes

## 2022-09-09 ENCOUNTER — HOSPITAL ENCOUNTER (OUTPATIENT)
Dept: SLEEP MEDICINE | Facility: HOSPITAL | Age: 27
Discharge: HOME | End: 2022-09-09
Admitting: NURSE PRACTITIONER

## 2022-09-09 VITALS — BODY MASS INDEX: 36.83 KG/M2 | WEIGHT: 243 LBS | HEIGHT: 68 IN

## 2022-09-09 DIAGNOSIS — G47.19 EXCESSIVE DAYTIME SLEEPINESS: ICD-10-CM

## 2022-09-09 DIAGNOSIS — R29.818 SUSPECTED SLEEP APNEA: ICD-10-CM

## 2022-09-09 DIAGNOSIS — I10 ESSENTIAL HYPERTENSION: ICD-10-CM

## 2022-09-09 PROCEDURE — 95800 SLP STDY UNATTENDED: CPT | Performed by: INTERNAL MEDICINE

## 2022-09-09 PROCEDURE — 95800 SLP STDY UNATTENDED: CPT

## 2022-09-14 DIAGNOSIS — G47.33 OSA (OBSTRUCTIVE SLEEP APNEA): Primary | ICD-10-CM

## 2022-09-16 NOTE — PROGRESS NOTES
CALLED PATIENT AND ADVISED OF STUDY RESULTS. PATIENT VERBALIZED UNDERSTANDING AND WAS AGREEABLE TO PAP THERAPY. FAXED ORDER TO [facility] 09/16/22 TRC

## 2022-09-23 ENCOUNTER — TELEPHONE (OUTPATIENT)
Dept: SLEEP MEDICINE | Facility: HOSPITAL | Age: 27
End: 2022-09-23

## 2022-09-23 NOTE — TELEPHONE ENCOUNTER
Patient states that he needs a DME company closer to home since he lives in [place]. He said [facility] in [place] would be closer to him.    Please send order to Sarah in [place].

## 2022-12-16 ENCOUNTER — TELEMEDICINE (OUTPATIENT)
Dept: SLEEP MEDICINE | Facility: HOSPITAL | Age: 27
End: 2022-12-16

## 2022-12-16 VITALS — WEIGHT: 245 LBS | BODY MASS INDEX: 34.3 KG/M2 | HEIGHT: 71 IN

## 2022-12-16 DIAGNOSIS — G47.33 OSA (OBSTRUCTIVE SLEEP APNEA): Primary | ICD-10-CM

## 2022-12-16 PROCEDURE — 99213 OFFICE O/P EST LOW 20 MIN: CPT | Performed by: NURSE PRACTITIONER

## 2022-12-16 NOTE — PROGRESS NOTES
"Chief Complaint:   Chief Complaint   Patient presents with   • Follow-up       HPI:    Reid Leonardo is a 27 y.o. male here for follow-up of sleep apnea.  Patient was seen in consult 5/11/2022 for excessive daytime sleepiness, gasping, and frequent nighttime awakenings.  He had a sleep study 9/9/2022 that did show mild obstructive sleep apnea and he initiated CPAP therapy.  Patient states \"I feel fantastic, this is night and day difference.\"  He is sleeping 6-1/2 to 7 hours nightly and does feel rested upon awakening.  He goes to sleep within 15 minutes and does not get up during the night.  Patient has an [place] score of 1/24.  Patient has no concerns or complaints and wishes to continue therapy.        Current medications are:   Current Outpatient Medications:   •  lisinopril (PRINIVIL,ZESTRIL) 10 MG tablet, Take 1 tablet by mouth Daily., Disp: 30 tablet, Rfl: 11  •  loratadine (CLARITIN) 10 MG tablet, Take 10 mg by mouth Daily., Disp: , Rfl:   •  NAPROXEN PO, Take 200 mg by mouth 2 (Two) Times a Day., Disp: , Rfl: .      The patient's relevant past medical, surgical, family and social history were reviewed and updated in Epic as appropriate.       Review of Systems   Eyes: Positive for visual disturbance.   Respiratory: Positive for apnea.    Musculoskeletal: Positive for arthralgias.   Allergic/Immunologic: Positive for environmental allergies and food allergies.   Neurological: Positive for tremors and headaches.   Psychiatric/Behavioral: Positive for sleep disturbance.   All other systems reviewed and are negative.        Objective:    Physical Exam  Constitutional:       Appearance: Normal appearance.   HENT:      Head: Normocephalic and atraumatic.      Mouth/Throat:      Comments: Mallampati 4 anatomy  Pulmonary:      Effort: Pulmonary effort is normal. No respiratory distress.   Neurological:      Mental Status: He is alert and oriented to person, place, and time.   Psychiatric:         Mood and " Affect: Mood normal.         Behavior: Behavior normal.         Thought Content: Thought content normal.         Judgment: Judgment normal.         CPAP Report    49/49 days of use  Greater than 4-hour use 96%  Settings 8-18  95th percentile pressure 10.2  AHI of 0.9  The patient continues to use and benefit from CPAP therapy.    ASSESSMENT/PLAN    Diagnoses and all orders for this visit:    1. RICHIE (obstructive sleep apnea) (Primary)  -     PAP Therapy        1. Counseled patient regarding multimodal approach with healthy nutrition, healthy sleep, regular physical activity, social activities, counseling, and medications. Encouraged to practice lateral sleep position. Avoid alcohol and sedatives close to bedtime.  2.   Refill supplies x1 year.  Return to clinic 1 year or sooner symptoms warrant.  Patient gave consent for video visit.    I have reviewed the results of my evaluation and impression and discussed my recommendations in detail with the patient.      Signed by  RADHA Mcdermott    December 16, 2022      CC: Alfredo Gupta PA         No ref. provider found

## 2023-03-03 ENCOUNTER — HOSPITAL ENCOUNTER (OUTPATIENT)
Dept: GENERAL RADIOLOGY | Facility: HOSPITAL | Age: 28
Discharge: HOME | End: 2023-03-03
Admitting: PHYSICIAN ASSISTANT
Payer: COMMERCIAL

## 2023-03-03 ENCOUNTER — OFFICE VISIT (OUTPATIENT)
Dept: FAMILY MEDICINE CLINIC | Facility: CLINIC | Age: 28
End: 2023-03-03
Payer: COMMERCIAL

## 2023-03-03 VITALS
HEIGHT: 71 IN | OXYGEN SATURATION: 97 % | HEART RATE: 90 BPM | BODY MASS INDEX: 36.68 KG/M2 | DIASTOLIC BLOOD PRESSURE: 80 MMHG | TEMPERATURE: 97.3 F | SYSTOLIC BLOOD PRESSURE: 118 MMHG | WEIGHT: 262 LBS

## 2023-03-03 DIAGNOSIS — M25.561 BILATERAL CHRONIC KNEE PAIN: ICD-10-CM

## 2023-03-03 DIAGNOSIS — G89.29 BILATERAL CHRONIC KNEE PAIN: ICD-10-CM

## 2023-03-03 DIAGNOSIS — I10 PRIMARY HYPERTENSION: Primary | ICD-10-CM

## 2023-03-03 DIAGNOSIS — M25.562 BILATERAL CHRONIC KNEE PAIN: ICD-10-CM

## 2023-03-03 PROCEDURE — 99213 OFFICE O/P EST LOW 20 MIN: CPT | Performed by: PHYSICIAN ASSISTANT

## 2023-03-03 PROCEDURE — 73562 X-RAY EXAM OF KNEE 3: CPT

## 2023-03-03 RX ORDER — MELOXICAM 7.5 MG/1
7.5 TABLET ORAL DAILY
Qty: 30 TABLET | Refills: 1 | Status: SHIPPED | OUTPATIENT
Start: 2023-03-03

## 2023-03-03 NOTE — PROGRESS NOTES
Chief Complaint   Patient presents with   • Hypertension     6 month f/u        HPI     Reid Leonardo is a 27 y.o. male who is here for 6 month follow-up of hypertension.    Patient reports significant walking on a retreat in [facility] in January caused increased bilateral knee pain. Naproxen improves pain. Voltaren gel helps but he is not a fan of the topical route. He reports knee pain for about 3 years.     He notes an improvement in energy and sleep since he started to use a CPAP last year.   He states blood pressures at home run about 120/80 when checked every now and then. Compliant on lisinopril.    Past Medical History:   Diagnosis Date   • Arthritis    • Fracture    • Headache    • Hypertension        History reviewed. No pertinent surgical history.    Family History   Problem Relation Age of Onset   • Sleep disorder Mother    • Arthritis Mother    • Cancer Mother    • Diabetes Mother    • Mental illness Mother    • Obesity Mother    • Depression Mother    • Miscarriages / Stillbirths Mother    • Sleep disorder Father    • Arthritis Father    • Diabetes Father    • Hypertension Father    • Migraines Father    • Obesity Father    • Depression Sister    • Miscarriages / Stillbirths Sister    • Asthma Maternal Grandmother    • Heart attack Maternal Grandfather    • Asthma Maternal Grandfather    • Cancer Maternal Grandfather    • Lung cancer Maternal Grandfather        Social History     Socioeconomic History   • Marital status: Single   Tobacco Use   • Smoking status: Some Days     Types: Pipe   • Smokeless tobacco: Never   Vaping Use   • Vaping Use: Never used   Substance and Sexual Activity   • Alcohol use: Yes     Alcohol/week: 3.0 standard drinks     Types: 2 Cans of beer, 1 Drinks containing 0.5 oz of alcohol per week     Comment: 2 per week   • Drug use: No   • Sexual activity: Never       Allergies   Allergen Reactions   • Menthol Unknown (See Comments)     Breathing problem       ROS    Review  of Systems   Eyes: Negative for blurred vision.   Respiratory: Negative for cough and shortness of breath.    Cardiovascular: Negative for chest pain.   Skin: Negative for rash.   Neurological: Positive for headache. Negative for dizziness.       Vitals:    03/03/23 1503   BP: 118/80   Pulse: 90   Temp: 97.3 °F (36.3 °C)   SpO2: 97%     Body mass index is 36.54 kg/m².      Current Outpatient Medications:   •  lisinopril (PRINIVIL,ZESTRIL) 10 MG tablet, Take 1 tablet by mouth Daily., Disp: 30 tablet, Rfl: 11  •  meloxicam (Mobic) 7.5 MG tablet, Take 1 tablet by mouth Daily., Disp: 30 tablet, Rfl: 1    PE    Physical Exam  Vitals reviewed.   Constitutional:       General: He is not in acute distress.     Appearance: He is well-developed. He is obese.   HENT:      Head: Normocephalic and atraumatic.   Eyes:      Conjunctiva/sclera: Conjunctivae normal.   Cardiovascular:      Rate and Rhythm: Normal rate and regular rhythm.      Heart sounds: Normal heart sounds. No murmur heard.  Pulmonary:      Effort: Pulmonary effort is normal.      Breath sounds: Normal breath sounds.   Musculoskeletal:      Cervical back: Normal range of motion.      Right knee: No effusion or crepitus. Normal range of motion. No tenderness.      Left knee: No effusion or crepitus. Normal range of motion. No tenderness.   Skin:     General: Skin is warm and dry.   Neurological:      Mental Status: He is alert.      Gait: Gait normal.   Psychiatric:         Speech: Speech normal.         Behavior: Behavior normal.         Results    Results for orders placed or performed in visit on 08/18/22   CBC (No Diff)    Specimen: Blood   Result Value Ref Range    WBC 6.65 3.40 - 10.80 10*3/mm3    RBC 5.30 4.14 - 5.80 10*6/mm3    Hemoglobin 15.9 13.0 - 17.7 g/dL    Hematocrit 47.4 37.5 - 51.0 %    MCV 89.4 79.0 - 97.0 fL    MCH 30.0 26.6 - 33.0 pg    MCHC 33.5 31.5 - 35.7 g/dL    RDW 11.9 (L) 12.3 - 15.4 %    RDW-SD 38.8 37.0 - 54.0 fl    MPV 11.2 6.0 - 12.0  fL    Platelets 223 140 - 450 10*3/mm3   Lipid Panel    Specimen: Blood   Result Value Ref Range    Total Cholesterol 172 0 - 200 mg/dL    Triglycerides 90 0 - 150 mg/dL    HDL Cholesterol 47 40 - 60 mg/dL    LDL Cholesterol  108 (H) 0 - 100 mg/dL    VLDL Cholesterol 17 5 - 40 mg/dL    LDL/HDL Ratio 2.28    TSH Rfx On Abnormal To Free T4    Specimen: Blood   Result Value Ref Range    TSH 3.240 0.270 - 4.200 uIU/mL   Hepatic Function Panel    Specimen: Blood   Result Value Ref Range    Total Protein 7.2 6.0 - 8.5 g/dL    Albumin 4.90 3.50 - 5.20 g/dL    ALT (SGPT) 28 1 - 41 U/L    AST (SGOT) 24 1 - 40 U/L    Alkaline Phosphatase 44 39 - 117 U/L    Total Bilirubin 1.1 0.0 - 1.2 mg/dL    Bilirubin, Direct <0.2 0.0 - 0.3 mg/dL    Bilirubin, Indirect     Hepatitis C Antibody    Specimen: Blood   Result Value Ref Range    Hepatitis C Ab Non-Reactive Non-Reactive       A/P    Problem List Items Addressed This Visit        Cardiac and Vasculature    Hypertension - Primary  -Adequate control.  Continue lisinopril.  -Monitor renal function and electrolytes.  -Return to clinic in 5 months for a physical, sooner as needed.    Relevant Orders    Basic Metabolic Panel   Other Visit Diagnoses     Bilateral chronic knee pain      -Will image knees and start meloxicam. Discussed risks of NSAIDs including renal impairment and PUD. Encouraged the patient to take medication sparingly and stop if he has GI discomfort.    -Reviewed benefit of weight reduction.     Relevant Orders    XR Knee 3 View Bilateral          Plan of care was reviewed with patient at the conclusion of today's visit. Education was provided regarding diagnoses, management, and the importance of keeping follow-up appointments. The patient was counseled regarding the risks, benefits, and possible side-effects of treatment. Patient and/or family express understanding and agreement with the management plan.        MICHELLE Magallanes  Answers for HPI/ROS submitted by the  patient on 3/1/2023  What is the primary reason for your visit?: Physical

## 2023-05-05 RX ORDER — MELOXICAM 7.5 MG/1
TABLET ORAL
Qty: 30 TABLET | Refills: 1 | Status: SHIPPED | OUTPATIENT
Start: 2023-05-05

## 2023-05-05 NOTE — TELEPHONE ENCOUNTER
Rx Refill Note  Requested Prescriptions     Pending Prescriptions Disp Refills   • meloxicam (MOBIC) 7.5 MG tablet [Pharmacy Med Name: MELOXICAM 7.5 MG TABLET] 30 tablet 1     Sig: TAKE 1 TABLET BY MOUTH EVERY DAY      Last office visit with prescribing clinician: 3/3/2023   Last telemedicine visit with prescribing clinician: 8/25/2023   Next office visit with prescribing clinician: 8/25/2023                         Would you like a call back once the refill request has been completed: [] Yes [] No    If the office needs to give you a call back, can they leave a voicemail: [] Yes [] No    Adriano Jackson MA  05/05/23, 08:59 EDT

## 2023-05-23 ENCOUNTER — TELEPHONE (OUTPATIENT)
Dept: FAMILY MEDICINE CLINIC | Facility: CLINIC | Age: 28
End: 2023-05-23
Payer: COMMERCIAL

## 2023-05-23 DIAGNOSIS — Z01.84 IMMUNITY STATUS TESTING: Primary | ICD-10-CM

## 2023-05-23 NOTE — TELEPHONE ENCOUNTER
I think he just needs a titer to make sure he is immune to varicella. I will order that. Shingrix is not indicated until age 50.

## 2023-05-23 NOTE — TELEPHONE ENCOUNTER
Caller: Ambrose Leonardo    Relationship: Self    Best call back number:     What is the best time to reach you: ANYTIME BEFORE 2P TODAY IF POSSIBLE; OK TO LEAVE MESSAGE    Who are you requesting to speak with (clinical staff, provider,  specific staff member): CLINICAL     Do you know the name of the person who called: AMBROSE    What was the call regarding: PATIENT WILL BE DOING A CPE INTERNSHIP THIS SUMMER AT [facility] AND WAS ASKING ABOUT GETTING THE 2ND DOSE OF VARCELLA VACCINE; HE HAD THE FIRST ONE AS A CHILD;  HE WAS ALSO ASKING ABOUT THE SHINGLES VACCINE WOULD IT BE OK TO GET OR DOES HE  JUST NEED A TITER?     Do you require a callback: YES

## 2023-05-23 NOTE — TELEPHONE ENCOUNTER
Attempted to contact patient. Patient answered and I went through Alfredo MARTINEZ's recommendations. Pt disconnected the phone.     I think he just needs a titer to make sure he is immune to varicella. I will order that. Shingrix is not indicated until age 50.    Hub may relay message and document if patient calls back

## 2023-05-24 ENCOUNTER — TELEPHONE (OUTPATIENT)
Dept: FAMILY MEDICINE CLINIC | Facility: CLINIC | Age: 28
End: 2023-05-24
Payer: COMMERCIAL

## 2023-05-24 NOTE — TELEPHONE ENCOUNTER
Attempted to contact patient, no answer. Left voicemail for patient to call the office back (office # given).     [facility] message also sent.

## 2023-05-24 NOTE — TELEPHONE ENCOUNTER
Caller: Reid Leonardo    Relationship to patient: Self    Best call back number: [phone]/ CAN LEAVE A MESSAGE      THE HUB READ THE FOLLOWING MESSAGE TO THE PATIENT    Attempted to contact patient. Patient answered and I went through Alfredo MARTINEZ's recommendations. Pt disconnected the phone.      I think he just needs a titer to make sure he is immune to varicella. I will order that. Shingrix is not indicated until age 50.     Hub may relay message and document if patient calls back    PATIENT STATES HE NEEDS THE TITER;  WILL HE NEED TO SCHEDULE AN APPOINTMENT OR WILL THIS BE A NURSES VISIT

## 2023-05-26 ENCOUNTER — LAB (OUTPATIENT)
Dept: LAB | Facility: HOSPITAL | Age: 28
End: 2023-05-26
Payer: COMMERCIAL

## 2023-05-26 DIAGNOSIS — Z01.84 IMMUNITY STATUS TESTING: ICD-10-CM

## 2023-05-26 PROCEDURE — 86787 VARICELLA-ZOSTER ANTIBODY: CPT

## 2023-05-26 PROCEDURE — 36415 COLL VENOUS BLD VENIPUNCTURE: CPT

## 2023-05-27 LAB — VZV IGG SER IA-ACNC: <135 INDEX

## 2023-05-30 ENCOUNTER — PATIENT MESSAGE (OUTPATIENT)
Dept: FAMILY MEDICINE CLINIC | Facility: CLINIC | Age: 28
End: 2023-05-30

## 2023-05-30 ENCOUNTER — CLINICAL SUPPORT (OUTPATIENT)
Dept: FAMILY MEDICINE CLINIC | Facility: CLINIC | Age: 28
End: 2023-05-30

## 2023-05-30 ENCOUNTER — TELEPHONE (OUTPATIENT)
Dept: FAMILY MEDICINE CLINIC | Facility: CLINIC | Age: 28
End: 2023-05-30

## 2023-05-30 DIAGNOSIS — Z23 IMMUNIZATION DUE: Primary | ICD-10-CM

## 2023-05-30 PROCEDURE — 90471 IMMUNIZATION ADMIN: CPT | Performed by: PHYSICIAN ASSISTANT

## 2023-05-30 PROCEDURE — 90716 VAR VACCINE LIVE SUBQ: CPT | Performed by: PHYSICIAN ASSISTANT

## 2023-05-30 NOTE — TELEPHONE ENCOUNTER
Nahomi Bolaños APRN  You 2 minutes ago (9:23 AM)     His Titer indicates he is not immune. He can come in and get a dose today. I apologize for not clarifying that. We will need to records to know if he needs a 2nd dose or not.

## 2023-05-30 NOTE — TELEPHONE ENCOUNTER
Contacted pt & relayed covering provider's message. Pt states he had a titer done and also had a varicella as an infant. He states he will send what documents he has via [email or web address] for review.

## 2023-05-30 NOTE — TELEPHONE ENCOUNTER
Caller: Reid Leonardo    Relationship: Self    Best call back number: [phone]    What orders are you requesting (i.e. lab or imaging): VARICELLA 2ND DOSE    In what timeframe would the patient need to come in: AS SOON AS POSSIBLE TODAY    Where will you receive your lab/imaging services: OFFICE LAB    Additional notes: THIS IS A REQUIREMENT FOR HIS NEW JOB AND IT STARTS TOMORROW.    PLEASE CALL WHEN THIS IS IN THE SYSTEM. AND YOU HAVE PERMISSION TO LEAVE A VOICEMAIL

## 2023-06-19 ENCOUNTER — TELEPHONE (OUTPATIENT)
Dept: FAMILY MEDICINE CLINIC | Facility: CLINIC | Age: 28
End: 2023-06-19
Payer: COMMERCIAL

## 2023-06-19 DIAGNOSIS — M25.511 CHRONIC RIGHT SHOULDER PAIN: Primary | ICD-10-CM

## 2023-06-19 DIAGNOSIS — G89.29 CHRONIC RIGHT SHOULDER PAIN: Primary | ICD-10-CM

## 2023-06-19 RX ORDER — MELOXICAM 7.5 MG/1
7.5 TABLET ORAL 2 TIMES DAILY PRN
Qty: 180 TABLET | Refills: 0 | Status: SHIPPED | OUTPATIENT
Start: 2023-06-19

## 2023-06-19 NOTE — TELEPHONE ENCOUNTER
Patient called and said that he received a [facility] message saying he could  increase the Meloxicam 7.5 mg tabs to 2 x daily. His pharmacy did not receive a new prescription for the 7.5 mg x 2 daily. His previous prescription of 1 x daily is about to run out. He would like the new prescription to be sent to [facility], [place], [place] as soon as possible.

## 2023-08-25 RX ORDER — LISINOPRIL 10 MG/1
10 TABLET ORAL DAILY
Qty: 30 TABLET | Refills: 0 | Status: SHIPPED | OUTPATIENT
Start: 2023-08-25

## 2023-08-25 NOTE — TELEPHONE ENCOUNTER
Rx Refill Note  Requested Prescriptions     Pending Prescriptions Disp Refills    lisinopril (PRINIVIL,ZESTRIL) 10 MG tablet [Pharmacy Med Name: LISINOPRIL 10 MG TABLET] 90 tablet      Sig: TAKE 1 TABLET BY MOUTH DAILY      Last office visit with prescribing clinician: 3/3/2023   Last telemedicine visit with prescribing clinician: Visit date not found   Next office visit with prescribing clinician: 9/22/2023                         Would you like a call back once the refill request has been completed: [] Yes [] No    If the office needs to give you a call back, can they leave a voicemail: [] Yes [] No    Kelly Gonzalez MA  08/25/23, 14:41 EDT

## 2023-09-27 DIAGNOSIS — G89.29 CHRONIC RIGHT SHOULDER PAIN: ICD-10-CM

## 2023-09-27 DIAGNOSIS — M25.511 CHRONIC RIGHT SHOULDER PAIN: ICD-10-CM

## 2023-09-27 RX ORDER — LISINOPRIL 10 MG/1
10 TABLET ORAL DAILY
Qty: 30 TABLET | Refills: 0 | Status: SHIPPED | OUTPATIENT
Start: 2023-09-27

## 2023-09-27 RX ORDER — MELOXICAM 7.5 MG/1
7.5 TABLET ORAL 2 TIMES DAILY PRN
Qty: 60 TABLET | Refills: 0 | Status: SHIPPED | OUTPATIENT
Start: 2023-09-27

## 2023-09-27 NOTE — TELEPHONE ENCOUNTER
Rx Refill Note  Requested Prescriptions     Pending Prescriptions Disp Refills    meloxicam (MOBIC) 7.5 MG tablet 180 tablet 0     Sig: Take 1 tablet by mouth 2 (Two) Times a Day As Needed for Moderate Pain.      Last office visit with prescribing clinician: 3-3-23  Last telemedicine visit with prescribing clinician: Visit date not found   Next office visit with prescribing clinician: 10-20-23                        Would you like a call back once the refill request has been completed: [] Yes [] No    If the office needs to give you a call back, can they leave a voicemail: [] Yes [] No    Kelly Gonzalez MA  09/27/23, 09:25 EDT

## 2023-09-27 NOTE — TELEPHONE ENCOUNTER
Rx Refill Note  Requested Prescriptions     Pending Prescriptions Disp Refills    lisinopril (PRINIVIL,ZESTRIL) 10 MG tablet 30 tablet 0     Sig: Take 1 tablet by mouth Daily.      Last office visit with prescribing clinician: 3/3/2023   Last telemedicine visit with prescribing clinician: Visit date not found   Next office visit with prescribing clinician: 10/20/2023                         Would you like a call back once the refill request has been completed: [] Yes [] No    If the office needs to give you a call back, can they leave a voicemail: [] Yes [] No    Kelly Gonzalez MA  09/27/23, 09:03 EDT

## 2023-10-27 ENCOUNTER — LAB (OUTPATIENT)
Dept: LAB | Facility: HOSPITAL | Age: 28
End: 2023-10-27
Payer: COMMERCIAL

## 2023-10-27 ENCOUNTER — OFFICE VISIT (OUTPATIENT)
Dept: FAMILY MEDICINE CLINIC | Facility: CLINIC | Age: 28
End: 2023-10-27
Payer: COMMERCIAL

## 2023-10-27 VITALS
DIASTOLIC BLOOD PRESSURE: 84 MMHG | BODY MASS INDEX: 36.9 KG/M2 | HEIGHT: 71 IN | WEIGHT: 263.6 LBS | SYSTOLIC BLOOD PRESSURE: 118 MMHG | OXYGEN SATURATION: 97 % | HEART RATE: 83 BPM

## 2023-10-27 DIAGNOSIS — Z00.00 HEALTHCARE MAINTENANCE: Primary | ICD-10-CM

## 2023-10-27 DIAGNOSIS — J30.2 SEASONAL ALLERGIC RHINITIS, UNSPECIFIED TRIGGER: ICD-10-CM

## 2023-10-27 DIAGNOSIS — M25.562 BILATERAL CHRONIC KNEE PAIN: ICD-10-CM

## 2023-10-27 DIAGNOSIS — G89.29 BILATERAL CHRONIC KNEE PAIN: ICD-10-CM

## 2023-10-27 DIAGNOSIS — I10 PRIMARY HYPERTENSION: ICD-10-CM

## 2023-10-27 DIAGNOSIS — G47.33 OSA (OBSTRUCTIVE SLEEP APNEA): ICD-10-CM

## 2023-10-27 DIAGNOSIS — M25.511 CHRONIC RIGHT SHOULDER PAIN: ICD-10-CM

## 2023-10-27 DIAGNOSIS — G89.29 CHRONIC RIGHT SHOULDER PAIN: ICD-10-CM

## 2023-10-27 DIAGNOSIS — M17.0 PRIMARY OSTEOARTHRITIS OF BOTH KNEES: ICD-10-CM

## 2023-10-27 DIAGNOSIS — M25.561 BILATERAL CHRONIC KNEE PAIN: ICD-10-CM

## 2023-10-27 DIAGNOSIS — E66.01 CLASS 2 SEVERE OBESITY DUE TO EXCESS CALORIES WITH SERIOUS COMORBIDITY AND BODY MASS INDEX (BMI) OF 36.0 TO 36.9 IN ADULT: ICD-10-CM

## 2023-10-27 DIAGNOSIS — Z23 NEEDS FLU SHOT: ICD-10-CM

## 2023-10-27 DIAGNOSIS — Z00.00 HEALTHCARE MAINTENANCE: ICD-10-CM

## 2023-10-27 LAB
ALBUMIN SERPL-MCNC: 4.7 G/DL (ref 3.5–5.2)
ALBUMIN/GLOB SERPL: 1.9 G/DL
ALP SERPL-CCNC: 42 U/L (ref 39–117)
ALT SERPL W P-5'-P-CCNC: 33 U/L (ref 1–41)
ANION GAP SERPL CALCULATED.3IONS-SCNC: 12.1 MMOL/L (ref 5–15)
ARTICHOKE IGE QN: 97 MG/DL (ref 0–100)
AST SERPL-CCNC: 23 U/L (ref 1–40)
BILIRUB SERPL-MCNC: 1.1 MG/DL (ref 0–1.2)
BILIRUB UR QL STRIP: NEGATIVE
BUN SERPL-MCNC: 15 MG/DL (ref 6–20)
BUN/CREAT SERPL: 13.9 (ref 7–25)
CALCIUM SPEC-SCNC: 9.3 MG/DL (ref 8.6–10.5)
CHLORIDE SERPL-SCNC: 102 MMOL/L (ref 98–107)
CLARITY UR: CLEAR
CO2 SERPL-SCNC: 25.9 MMOL/L (ref 22–29)
COLOR UR: YELLOW
CREAT SERPL-MCNC: 1.08 MG/DL (ref 0.76–1.27)
DEPRECATED RDW RBC AUTO: 40.1 FL (ref 37–54)
EGFRCR SERPLBLD CKD-EPI 2021: 96.5 ML/MIN/1.73
ERYTHROCYTE [DISTWIDTH] IN BLOOD BY AUTOMATED COUNT: 12.1 % (ref 12.3–15.4)
GLOBULIN UR ELPH-MCNC: 2.5 GM/DL
GLUCOSE SERPL-MCNC: 77 MG/DL (ref 65–99)
GLUCOSE UR STRIP-MCNC: NEGATIVE MG/DL
HBA1C MFR BLD: 5.3 % (ref 4.8–5.6)
HCT VFR BLD AUTO: 44.5 % (ref 37.5–51)
HGB BLD-MCNC: 15.6 G/DL (ref 13–17.7)
HGB UR QL STRIP.AUTO: NEGATIVE
KETONES UR QL STRIP: NEGATIVE
LEUKOCYTE ESTERASE UR QL STRIP.AUTO: NEGATIVE
MCH RBC QN AUTO: 31.8 PG (ref 26.6–33)
MCHC RBC AUTO-ENTMCNC: 35.1 G/DL (ref 31.5–35.7)
MCV RBC AUTO: 90.8 FL (ref 79–97)
NITRITE UR QL STRIP: NEGATIVE
PH UR STRIP.AUTO: 6.5 [PH] (ref 5–8)
PLATELET # BLD AUTO: 205 10*3/MM3 (ref 140–450)
PMV BLD AUTO: 11.4 FL (ref 6–12)
POTASSIUM SERPL-SCNC: 4.7 MMOL/L (ref 3.5–5.2)
PROT SERPL-MCNC: 7.2 G/DL (ref 6–8.5)
PROT UR QL STRIP: NEGATIVE
RBC # BLD AUTO: 4.9 10*6/MM3 (ref 4.14–5.8)
SODIUM SERPL-SCNC: 140 MMOL/L (ref 136–145)
SP GR UR STRIP: 1.02 (ref 1–1.03)
TSH SERPL DL<=0.05 MIU/L-ACNC: 1.99 UIU/ML (ref 0.27–4.2)
UROBILINOGEN UR QL STRIP: NORMAL
WBC NRBC COR # BLD: 7.15 10*3/MM3 (ref 3.4–10.8)

## 2023-10-27 PROCEDURE — 81003 URINALYSIS AUTO W/O SCOPE: CPT

## 2023-10-27 PROCEDURE — 80050 GENERAL HEALTH PANEL: CPT

## 2023-10-27 PROCEDURE — 83721 ASSAY OF BLOOD LIPOPROTEIN: CPT

## 2023-10-27 PROCEDURE — 83036 HEMOGLOBIN GLYCOSYLATED A1C: CPT

## 2023-10-27 RX ORDER — FLUTICASONE PROPIONATE 50 MCG
1-2 SPRAY, SUSPENSION (ML) NASAL DAILY
Qty: 16 G | Refills: 11 | Status: SHIPPED | OUTPATIENT
Start: 2023-10-27

## 2023-10-27 RX ORDER — LISINOPRIL 10 MG/1
10 TABLET ORAL DAILY
Qty: 90 TABLET | Refills: 2 | Status: SHIPPED | OUTPATIENT
Start: 2023-10-27

## 2023-10-27 RX ORDER — MELOXICAM 7.5 MG/1
7.5 TABLET ORAL 2 TIMES DAILY PRN
Qty: 180 TABLET | Refills: 1 | Status: SHIPPED | OUTPATIENT
Start: 2023-10-27

## 2023-10-27 NOTE — PROGRESS NOTES
Reason for visit    Reid Leonardo is a 27 y.o. male who presents for annual comprehensive exam.    Chief Complaint   Patient presents with    Annual Exam       HPI     Here for physical.   Currently working for [employer] in [place], [place].   Some nasal congestion currently which he attributes to seasonal allergies.     Diet/Physical activity:  -More sedentary, not really any time to exercise. With less activity he notices joints hurting more particularly the knees.   -Gain of 20 pounds from last year.  -He has had success losing weight with diet and exercise in the past.   -Eats whatever food [facility] out at [employer]. 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    -Not fasting today.     Immunizations:  -He has declined pneumonia vaccines previously.   -Interested in influenza vaccine today.   -Discussed updated COVID booster. Declines this.      Cancer screening:   -Positive Fhx: spinal cancer (mother), brain tumor (maternal aunt), lung cancer (MGF - tobacco use)  -Negative Fhx: testicular, prostate, colon cancer, colon polyps, melanoma.    PHQ-9 Depression Screening  Little interest or pleasure in doing things? 0-->not at all   Feeling down, depressed, or hopeless? 0-->not at all   Trouble falling or staying asleep, or sleeping too much?     Feeling tired or having little energy?     Poor appetite or overeating?     Feeling bad about yourself - or that you are a failure or have let yourself or your family down?     Trouble concentrating on things, such as reading the newspaper or watching television?     Moving or speaking so slowly that other people could have noticed? Or the opposite - being so fidgety or restless that you have been moving around a lot more than usual?     Thoughts that you would be better off dead, or of hurting yourself in some way?     PHQ-9 Total Score 0   If you checked off any problems, how difficult have these problems made it for you to do your work, take care of  things at home, or get along with other people?           Substance use:  -He smokes a pipe and cigars intermittently.  -Denies recreational drug use.  -1 beer per week on average.   -Has decreased to 6-8 cups of coffee daily    Sexual health:  -Denies new sexual partners in the past year.     Dental/vision/skin:   -Due for eye exam.  -Overdue for dental exam.  -Denies new, changing or concerning skin lesions.    Past Medical History:   Diagnosis Date    Arthritis     Fracture     Headache     Hypertension        No past surgical history on file.    Family History   Problem Relation Age of Onset    Sleep disorder Mother     Arthritis Mother     Cancer Mother     Diabetes Mother     Mental illness Mother     Obesity Mother     Depression Mother     Miscarriages / Stillbirths Mother     Sleep disorder Father     Arthritis Father     Diabetes Father     Hypertension Father     Migraines Father     Obesity Father     Depression Sister     Miscarriages / Stillbirths Sister     Asthma Maternal Grandmother     Heart attack Maternal Grandfather     Asthma Maternal Grandfather     Cancer Maternal Grandfather     Lung cancer Maternal Grandfather        Social History     Socioeconomic History    Marital status: Single   Tobacco Use    Smoking status: Some Days     Types: Pipe    Smokeless tobacco: Never   Vaping Use    Vaping Use: Never used   Substance and Sexual Activity    Alcohol use: Yes     Alcohol/week: 3.0 standard drinks of alcohol     Types: 2 Cans of beer, 1 Drinks containing 0.5 oz of alcohol per week     Comment: 2 per week    Drug use: No    Sexual activity: Never       Allergies   Allergen Reactions    Menthol Unknown (See Comments)     Breathing problem       ROS    Review of Systems   Constitutional:  Negative for appetite change, chills, diaphoresis, fatigue, fever and unexpected weight loss.   HENT:  Positive for congestion and rhinorrhea. Negative for hearing loss, sore throat, swollen glands and trouble  swallowing.    Eyes:  Negative for blurred vision and visual disturbance.   Respiratory:  Negative for cough and shortness of breath.    Cardiovascular:  Negative for chest pain.   Gastrointestinal:  Negative for abdominal pain, blood in stool, constipation, diarrhea and GERD.   Endocrine: Negative for polydipsia.   Genitourinary:  Negative for difficulty urinating, dysuria, hematuria, nocturia, scrotal swelling and testicular pain.   Musculoskeletal:  Positive for arthralgias.   Skin:  Negative for rash and skin lesions.   Allergic/Immunologic: Positive for environmental allergies.   Neurological:  Positive for headache. Negative for dizziness and numbness.   Hematological:  Negative for adenopathy.   Psychiatric/Behavioral:  Negative for sleep disturbance and depressed mood. The patient is not nervous/anxious.        Vitals:    10/27/23 1140   BP: 118/84   Pulse: 83   SpO2: 97%     Body mass index is 36.78 kg/m².      Current Outpatient Medications:     lisinopril (PRINIVIL,ZESTRIL) 10 MG tablet, Take 1 tablet by mouth Daily., Disp: 90 tablet, Rfl: 2    meloxicam (MOBIC) 7.5 MG tablet, Take 1 tablet by mouth 2 (Two) Times a Day As Needed for Moderate Pain., Disp: 180 tablet, Rfl: 1    fluticasone (FLONASE) 50 MCG/ACT nasal spray, 1-2 sprays into the nostril(s) as directed by provider Daily., Disp: 16 g, Rfl: 11    PE    Physical Exam  Vitals reviewed.   Constitutional:       General: He is not in acute distress.     Appearance: He is well-developed. He is obese.   HENT:      Head: Normocephalic and atraumatic.      Right Ear: Hearing and tympanic membrane normal.      Left Ear: Hearing and tympanic membrane normal.      Nose:      Right Turbinates: Swollen and pale.      Left Turbinates: Swollen and pale.      Mouth/Throat:      Mouth: Mucous membranes are moist.      Dentition: Normal dentition.      Pharynx: Oropharynx is clear.   Eyes:      Extraocular Movements: Extraocular movements intact.       Conjunctiva/sclera: Conjunctivae normal.      Pupils: Pupils are equal, round, and reactive to light.      Comments:      Neck:      Thyroid: No thyroid mass or thyromegaly.      Vascular: No carotid bruit.   Cardiovascular:      Rate and Rhythm: Normal rate and regular rhythm.      Heart sounds: No murmur heard.     No friction rub.   Pulmonary:      Effort: Pulmonary effort is normal.      Breath sounds: Normal breath sounds.   Abdominal:      General: Bowel sounds are normal. There is no abdominal bruit.      Palpations: Abdomen is soft. There is no mass.      Tenderness: There is no abdominal tenderness.   Musculoskeletal:         General: Normal range of motion.      Cervical back: Normal range of motion and neck supple.   Lymphadenopathy:      Cervical: No cervical adenopathy.      Upper Body:      Right upper body: No supraclavicular adenopathy.      Left upper body: No supraclavicular adenopathy.   Skin:     General: Skin is warm and dry.      Findings: No rash.      Nails: There is no clubbing.      Comments: No suspicious nevi on clothed exam.    Neurological:      Mental Status: He is alert.      Gait: Gait normal.      Deep Tendon Reflexes: Reflexes normal.   Psychiatric:         Speech: Speech normal.         Behavior: Behavior normal.         A/P    Problem List Items Addressed This Visit          Allergies and Adverse Reactions    Seasonal allergic rhinitis    Current Assessment & Plan     Continue antihistamine. Start flonase daily.          Relevant Medications    meloxicam (MOBIC) 7.5 MG tablet       Cardiac and Vasculature    Hypertension    Current Assessment & Plan     Hypertension is controlled.   Continue lisinopril.          Relevant Medications    lisinopril (PRINIVIL,ZESTRIL) 10 MG tablet       Endocrine and Metabolic    Class 2 obesity due to excess calories with body mass index (BMI) of 36.0 to 36.9 in adult    Current Assessment & Plan     Patient's (Body mass index is 36.78 kg/m².)  indicates that they are obese (BMI >30) with health conditions that include obstructive sleep apnea, hypertension, and osteoarthritis . Weight is worsening. BMI  is above average; BMI management plan is completed. We discussed low calorie, low carb based diet program, portion control, and increasing exercise.               Health Encounters    Healthcare maintenance - Primary    Current Assessment & Plan     -Counseled patient regarding cancer screening, immunizations, healthy lifestyle, diet, maintaining a healthy weight, and exercise.   -Annual dental and eye exams were encouraged.   -Checking screening labs.   -RTC in 6 months for hypertension follow-up.          Relevant Orders    CBC (No Diff) (Completed)    Comprehensive Metabolic Panel (Completed)    TSH Rfx On Abnormal To Free T4 (Completed)    Urinalysis With Culture If Indicated - Urine, Clean Catch (Completed)    Hemoglobin A1c (Completed)    LDL cholesterol, direct (Completed)       Musculoskeletal and Injuries    Primary osteoarthritis of both knees    Current Assessment & Plan     Discussed sparing use of meloxicam prn as much as possible.             Sleep    RICHIE (obstructive sleep apnea)    Overview     Uses CPAP.           Other Visit Diagnoses       Needs flu shot        Relevant Orders    Fluzone >6 Months ([id number]) (Completed)    Bilateral chronic knee pain        Chronic right shoulder pain        Relevant Medications    meloxicam (MOBIC) 7.5 MG tablet            Plan of care was reviewed with patient at the conclusion of today's visit. Education was provided regarding diagnoses, management, treatment plan, and the importance of keeping follow-up appointments. The patient was counseled regarding the risks, benefits, and possible side-effects of treatment. Patient and/or family expresses understanding and agreement with the management plan.        MICHELLE Magallanes

## 2023-10-28 PROBLEM — Z00.00 HEALTHCARE MAINTENANCE: Status: ACTIVE | Noted: 2023-10-28

## 2023-10-28 PROBLEM — M17.0 PRIMARY OSTEOARTHRITIS OF BOTH KNEES: Status: ACTIVE | Noted: 2023-10-28

## 2023-10-28 LAB — HOLD SPECIMEN: NORMAL

## 2023-10-28 NOTE — ASSESSMENT & PLAN NOTE
-Counseled patient regarding cancer screening, immunizations, healthy lifestyle, diet, maintaining a healthy weight, and exercise.   -Annual dental and eye exams were encouraged.   -Checking screening labs.   -RTC in 6 months for hypertension follow-up.

## 2023-10-28 NOTE — ASSESSMENT & PLAN NOTE
Patient's (Body mass index is 36.78 kg/m².) indicates that they are obese (BMI >30) with health conditions that include obstructive sleep apnea, hypertension, and osteoarthritis . Weight is worsening. BMI  is above average; BMI management plan is completed. We discussed low calorie, low carb based diet program, portion control, and increasing exercise.

## 2024-01-10 ENCOUNTER — TELEMEDICINE (OUTPATIENT)
Dept: SLEEP MEDICINE | Facility: CLINIC | Age: 29
End: 2024-01-10
Payer: COMMERCIAL

## 2024-01-10 VITALS — WEIGHT: 260 LBS | BODY MASS INDEX: 36.4 KG/M2 | HEIGHT: 71 IN

## 2024-01-10 DIAGNOSIS — G47.33 OSA (OBSTRUCTIVE SLEEP APNEA): Primary | ICD-10-CM

## 2024-01-10 PROCEDURE — 99213 OFFICE O/P EST LOW 20 MIN: CPT | Performed by: NURSE PRACTITIONER

## 2024-01-10 NOTE — PROGRESS NOTES
Chief Complaint:   Chief Complaint   Patient presents with    Follow-up       HPI:    Reid Leonardo is a 28 y.o. male here for follow-up of sleep apnea.  Patient was last seen 12/16/2022.  Patient continues to do well with CPAP therapy.  Patient is sleeping anywhere between 5 and 9 hours nightly depended on his work schedule.  Patient goes to sleep within 15 minutes and does not get up during the night.  Patient has an [place] score of 1/24.  Patient has no concerns or complaints and wishes to continue therapy.        Current medications are:   Current Outpatient Medications:     fluticasone (FLONASE) 50 MCG/ACT nasal spray, 1-2 sprays into the nostril(s) as directed by provider Daily., Disp: 16 g, Rfl: 11    lisinopril (PRINIVIL,ZESTRIL) 10 MG tablet, Take 1 tablet by mouth Daily., Disp: 90 tablet, Rfl: 2    meloxicam (MOBIC) 7.5 MG tablet, Take 1 tablet by mouth 2 (Two) Times a Day As Needed for Moderate Pain., Disp: 180 tablet, Rfl: 1.      The patient's relevant past medical, surgical, family and social history were reviewed and updated in Epic as appropriate.       Review of Systems   HENT:  Positive for congestion.    Eyes:  Positive for visual disturbance.   Respiratory:  Positive for apnea.    Allergic/Immunologic: Positive for environmental allergies and food allergies.   Neurological:  Positive for tremors and headaches.   Psychiatric/Behavioral:  Positive for sleep disturbance.    All other systems reviewed and are negative.        Objective:    Physical Exam  Constitutional:       Appearance: Normal appearance.   HENT:      Head: Normocephalic and atraumatic.      Mouth/Throat:      Comments: Mallampati 4 anatomy  Pulmonary:      Effort: Pulmonary effort is normal. No respiratory distress.   Neurological:      Mental Status: He is alert and oriented to person, place, and time.   Psychiatric:         Mood and Affect: Mood normal.         Behavior: Behavior normal.         Thought Content: Thought  content normal.         Judgment: Judgment normal.         CPAP Report  87/90 days of use  Greater than 4-hour use 89%  Setting 8-18  95th percentile pressure 10.5  AHI 0.9    The patient continues to use and benefit from CPAP therapy.    ASSESSMENT/PLAN    Diagnoses and all orders for this visit:    1. RICHIE (obstructive sleep apnea) (Primary)  -     PAP Therapy        Counseled patient regarding multimodal approach with healthy nutrition, healthy sleep, regular physical activity, social activities, counseling, and medications. Encouraged to practice lateral sleep position. Avoid alcohol and sedatives close to bedtime.  Refill supplies x 1 year.  Return to clinic 1 year or sooner if symptoms warrant.    The patient is located in [facility]. The patient presents today for telehealth service.  This service was conducted via audio/video technology through a secure [employer] video visit connection through Epic.  This provider is located in [facility].  Patient stated they are in a secure environment for the session.  Patient's condition being diagnosed/treated is appropriate for telemedicine.  The provider identified himself as well as his credentials.  The patient, and/or patient's guardian, consent to be seen remotely, and when consent is given they understanding that the consent allows for patient identifiable information to be sent to a third-party as needed.  They may refuse to be seen remotely at any time.  The electronic data is encrypted and password protected, and the patient and/or guardian has been advised of the potential risk to privacy not withstanding such measures.  Patient identifiers used: Name and date of birth.   I have reviewed the results of my evaluation and impression and discussed my recommendations in detail with the patient.      Signed by  RADHA Mcdermott    January 10, 2024      CC: Alfredo Gupta PA-C         No ref. provider found

## 2024-01-28 DIAGNOSIS — M25.511 CHRONIC RIGHT SHOULDER PAIN: ICD-10-CM

## 2024-01-28 DIAGNOSIS — G89.29 CHRONIC RIGHT SHOULDER PAIN: ICD-10-CM

## 2024-01-29 RX ORDER — MELOXICAM 7.5 MG/1
7.5 TABLET ORAL 2 TIMES DAILY PRN
Qty: 180 TABLET | Refills: 1 | Status: SHIPPED | OUTPATIENT
Start: 2024-01-29

## 2024-01-29 RX ORDER — LISINOPRIL 10 MG/1
10 TABLET ORAL DAILY
Qty: 90 TABLET | Refills: 1 | Status: SHIPPED | OUTPATIENT
Start: 2024-01-29

## 2024-02-06 DIAGNOSIS — M25.511 CHRONIC RIGHT SHOULDER PAIN: ICD-10-CM

## 2024-02-06 DIAGNOSIS — G89.29 CHRONIC RIGHT SHOULDER PAIN: ICD-10-CM

## 2024-02-06 NOTE — TELEPHONE ENCOUNTER
Caller: Reid Leonardo    Relationship: Self    Best call back number: [phone]    Requested Prescriptions:   Requested Prescriptions     Pending Prescriptions Disp Refills    meloxicam (MOBIC) 7.5 MG tablet 180 tablet 1     Sig: Take 1 tablet by mouth 2 (Two) Times a Day As Needed for Moderate Pain.        Pharmacy where request should be sent: [facility] [address] [phone] [facility] [phone]      Last office visit with prescribing clinician: 10/27/2023   Last telemedicine visit with prescribing clinician: Visit date not found   Next office visit with prescribing clinician: 5/17/2024     Additional details provided by patient: PT IS OUT SEND TO [facility]    Does the patient have less than a 3 day supply:  [x] Yes  [] No    Would you like a call back once the refill request has been completed: [x] Yes [] No    If the office needs to give you a call back, can they leave a voicemail: [] Yes [] No    Patrizia Murrieta Rep   02/06/24 14:53 EST

## 2024-02-08 RX ORDER — MELOXICAM 7.5 MG/1
7.5 TABLET ORAL 2 TIMES DAILY PRN
Qty: 180 TABLET | Refills: 1 | Status: SHIPPED | OUTPATIENT
Start: 2024-02-08

## 2024-02-21 ENCOUNTER — TELEMEDICINE (OUTPATIENT)
Dept: FAMILY MEDICINE CLINIC | Facility: CLINIC | Age: 29
End: 2024-02-21
Payer: COMMERCIAL

## 2024-02-21 DIAGNOSIS — M10.9 ACUTE GOUT OF LEFT FOOT, UNSPECIFIED CAUSE: Primary | ICD-10-CM

## 2024-02-21 PROCEDURE — 99213 OFFICE O/P EST LOW 20 MIN: CPT | Performed by: PHYSICIAN ASSISTANT

## 2024-02-21 RX ORDER — PREDNISONE 10 MG/1
TABLET ORAL
Qty: 27 TABLET | Refills: 0 | Status: SHIPPED | OUTPATIENT
Start: 2024-02-21

## 2024-02-21 NOTE — ASSESSMENT & PLAN NOTE
Treat with prednisone taper. Advised patient to hole meloxicam while on steroids.   Discussed low purine diet, limiting alcohol intake.   Plan to check uric acid on follow-up in May.

## 2024-02-21 NOTE — PROGRESS NOTES
CC  gout    HPI     Reid Leonardo is a pleasant 28 y.o. male who presents for a telehealth video visit. You have chosen to receive care through a telehealth visit.  Do you consent to use a video/audio connection for your medical care today? Yes.    Patient location: [place], [place].  Provider location: [facility] office.     He woke up with left great toe pain 3 days ago. Pain has progressively worsened since then. Started googeling and thinks it is gout. The whole front half of his left foot became red and swollen. He has been icing with some improvement. He denies an injury. States he had steak a couple of beers the week prior, more than usual. He has had similar pain in this toe in the past that only lasted for a few days then resolved.      Past Medical History:   Diagnosis Date    Arthritis     Fracture     Headache     Hypertension        No past surgical history on file.    Family History   Problem Relation Age of Onset    Sleep disorder Mother     Arthritis Mother     Cancer Mother     Diabetes Mother     Mental illness Mother     Obesity Mother     Depression Mother     Miscarriages / Stillbirths Mother     Sleep disorder Father     Arthritis Father     Diabetes Father     Hypertension Father     Migraines Father     Obesity Father     Depression Sister     Miscarriages / Stillbirths Sister     Asthma Maternal Grandmother     Heart attack Maternal Grandfather     Asthma Maternal Grandfather     Cancer Maternal Grandfather     Lung cancer Maternal Grandfather        Social History     Socioeconomic History    Marital status: Single   Tobacco Use    Smoking status: Some Days     Types: Pipe    Smokeless tobacco: Never   Vaping Use    Vaping Use: Never used   Substance and Sexual Activity    Alcohol use: Yes     Alcohol/week: 3.0 standard drinks of alcohol     Types: 2 Cans of beer, 1 Drinks containing 0.5 oz of alcohol per week     Comment: 2 per week    Drug use: No    Sexual activity: Never       Allergies    Allergen Reactions    Menthol Unknown (See Comments)     Breathing problem       ROS    Review of Systems   Musculoskeletal:  Positive for arthralgias and joint swelling.       There were no vitals filed for this visit.  There is no height or weight on file to calculate BMI.      Current Outpatient Medications:     fluticasone (FLONASE) 50 MCG/ACT nasal spray, 1-2 sprays into the nostril(s) as directed by provider Daily., Disp: 16 g, Rfl: 11    lisinopril (PRINIVIL,ZESTRIL) 10 MG tablet, Take 1 tablet by mouth Daily., Disp: 90 tablet, Rfl: 1    meloxicam (MOBIC) 7.5 MG tablet, Take 1 tablet by mouth 2 (Two) Times a Day As Needed for Moderate Pain., Disp: 180 tablet, Rfl: 1    predniSONE (DELTASONE) 10 MG tablet, Take 4 tablets po qam until gout flare resolves or up to 5 days, then 3 tabs qd x 1d, then 2 tabs qd x 1d, then 1 tab qd x 1d, then 1/2 tab qd x 1d, Disp: 27 tablet, Rfl: 0    PE    Physical Exam  Vitals reviewed.   Constitutional:       General: He is not in acute distress.     Appearance: He is obese.   Pulmonary:      Effort: Pulmonary effort is normal. No respiratory distress.   Feet:      Comments: Left treat toe and dorsal foot swelling noted. No significant erythema on limited exam.   Neurological:      Mental Status: He is alert.   Psychiatric:         Mood and Affect: Mood normal.          A/P    Problem List Items Addressed This Visit          Musculoskeletal and Injuries    Acute gout of left foot - Primary    Current Assessment & Plan     Treat with prednisone taper. Advised patient to hole meloxicam while on steroids.   Discussed low purine diet, limiting alcohol intake.   Plan to check uric acid on follow-up in May.             Plan of care was reviewed with patient at the conclusion of today's visit. Education was provided regarding diagnoses, management, prescribed or recommended OTC products, and the importance of compliance with follow-up appointments. The patient was counseled regarding  the risks, benefits, and possible side-effects of treatment. I advised the patient to keep me informed of any acute changes in their status including new, worsening, or persistent symptoms. Patient expresses understanding and agreement with the management plan.        Alfredo Gupta PA-C

## 2024-04-05 ENCOUNTER — PATIENT MESSAGE (OUTPATIENT)
Dept: FAMILY MEDICINE CLINIC | Facility: CLINIC | Age: 29
End: 2024-04-05
Payer: COMMERCIAL

## 2024-04-05 RX ORDER — COLCHICINE 0.6 MG/1
TABLET ORAL
Qty: 3 TABLET | Refills: 0 | Status: SHIPPED | OUTPATIENT
Start: 2024-04-05

## 2024-04-05 RX ORDER — COLCHICINE 0.6 MG/1
TABLET ORAL
Qty: 3 TABLET | Refills: 0 | Status: SHIPPED | OUTPATIENT
Start: 2024-04-05 | End: 2024-04-05 | Stop reason: SDUPTHER

## 2024-04-20 DIAGNOSIS — G89.29 CHRONIC RIGHT SHOULDER PAIN: ICD-10-CM

## 2024-04-20 DIAGNOSIS — M25.511 CHRONIC RIGHT SHOULDER PAIN: ICD-10-CM

## 2024-04-23 RX ORDER — LISINOPRIL 10 MG/1
10 TABLET ORAL DAILY
Qty: 90 TABLET | Refills: 1 | Status: SHIPPED | OUTPATIENT
Start: 2024-04-23

## 2024-04-23 RX ORDER — MELOXICAM 7.5 MG/1
7.5 TABLET ORAL 2 TIMES DAILY PRN
Qty: 180 TABLET | Refills: 0 | Status: SHIPPED | OUTPATIENT
Start: 2024-04-23

## 2024-04-23 NOTE — TELEPHONE ENCOUNTER
Rx Refill Note  Requested Prescriptions     Pending Prescriptions Disp Refills    meloxicam (MOBIC) 7.5 MG tablet 180 tablet 1     Sig: Take 1 tablet by mouth 2 (Two) Times a Day As Needed for Moderate Pain.     Signed Prescriptions Disp Refills    lisinopril (PRINIVIL,ZESTRIL) 10 MG tablet 90 tablet 1     Sig: Take 1 tablet by mouth Daily.     Authorizing Provider: MERLIN MANUEL     Ordering User: ELY GASTELUM      Last office visit with prescribing clinician: 10/27/2023   Last telemedicine visit with prescribing clinician: 2/21/2024   Next office visit with prescribing clinician: 5/17/2024                         Would you like a call back once the refill request has been completed: [] Yes [] No    If the office needs to give you a call back, can they leave a voicemail: [] Yes [] No    Ely Gastelum MA  04/23/24, 10:03 EDT

## 2024-05-01 ENCOUNTER — PATIENT MESSAGE (OUTPATIENT)
Dept: FAMILY MEDICINE CLINIC | Facility: CLINIC | Age: 29
End: 2024-05-01
Payer: COMMERCIAL

## 2024-05-01 RX ORDER — PREDNISONE 10 MG/1
TABLET ORAL
Qty: 27 TABLET | Refills: 0 | Status: SHIPPED | OUTPATIENT
Start: 2024-05-01 | End: 2024-05-01 | Stop reason: SDUPTHER

## 2024-05-01 RX ORDER — PREDNISONE 10 MG/1
TABLET ORAL
Qty: 27 TABLET | Refills: 0 | Status: SHIPPED | OUTPATIENT
Start: 2024-05-01

## 2024-05-17 ENCOUNTER — OFFICE VISIT (OUTPATIENT)
Dept: FAMILY MEDICINE CLINIC | Facility: CLINIC | Age: 29
End: 2024-05-17
Payer: COMMERCIAL

## 2024-05-17 ENCOUNTER — LAB (OUTPATIENT)
Dept: LAB | Facility: HOSPITAL | Age: 29
End: 2024-05-17
Payer: COMMERCIAL

## 2024-05-17 VITALS
OXYGEN SATURATION: 98 % | HEIGHT: 71 IN | HEART RATE: 79 BPM | BODY MASS INDEX: 38.54 KG/M2 | SYSTOLIC BLOOD PRESSURE: 124 MMHG | DIASTOLIC BLOOD PRESSURE: 80 MMHG | WEIGHT: 275.3 LBS

## 2024-05-17 DIAGNOSIS — M10.9 GOUT INVOLVING TOE OF LEFT FOOT, UNSPECIFIED CAUSE, UNSPECIFIED CHRONICITY: ICD-10-CM

## 2024-05-17 DIAGNOSIS — I10 PRIMARY HYPERTENSION: ICD-10-CM

## 2024-05-17 DIAGNOSIS — I10 PRIMARY HYPERTENSION: Primary | ICD-10-CM

## 2024-05-17 LAB
ANION GAP SERPL CALCULATED.3IONS-SCNC: 11.8 MMOL/L (ref 5–15)
BUN SERPL-MCNC: 14 MG/DL (ref 6–20)
BUN/CREAT SERPL: 14.7 (ref 7–25)
CALCIUM SPEC-SCNC: 9.4 MG/DL (ref 8.6–10.5)
CHLORIDE SERPL-SCNC: 103 MMOL/L (ref 98–107)
CO2 SERPL-SCNC: 23.2 MMOL/L (ref 22–29)
CREAT SERPL-MCNC: 0.95 MG/DL (ref 0.76–1.27)
EGFRCR SERPLBLD CKD-EPI 2021: 111.8 ML/MIN/1.73
GLUCOSE SERPL-MCNC: 86 MG/DL (ref 65–99)
POTASSIUM SERPL-SCNC: 4.8 MMOL/L (ref 3.5–5.2)
SODIUM SERPL-SCNC: 138 MMOL/L (ref 136–145)
URATE SERPL-MCNC: 7.9 MG/DL (ref 3.4–7)

## 2024-05-17 PROCEDURE — 84550 ASSAY OF BLOOD/URIC ACID: CPT

## 2024-05-17 PROCEDURE — 80048 BASIC METABOLIC PNL TOTAL CA: CPT

## 2024-05-17 PROCEDURE — 99214 OFFICE O/P EST MOD 30 MIN: CPT | Performed by: PHYSICIAN ASSISTANT

## 2024-05-17 NOTE — PROGRESS NOTES
Chief Complaint   Patient presents with    Hypertension     6 mo f/u for hypertension  Gout still a problem          HPI     Reid Leonardo is a 28 y.o. male who is here for 6 month  follow-up of hypertension.     He will be in [facility] for 9 weeks to learn Syriac for [employer].   Reports 4 gout flares in the 1st MTP joint of his left foot in the past 2 months. Still has some pain when he is walking. He didn't completely finish the last round of prednisone due to a GI illness. He is avoiding red meat, alcohol, organ meat. He was having diarrhea, nausea, nasal congestion. His grandmother had similar symptom after a [place] party. These symptoms have improved. He did stop meloxicam when he started steroids.   His hypertension has been controlled on lisinopril. Usually 120s/80s at home.     Past Medical History:   Diagnosis Date    Arthritis     Fracture     Headache     Hypertension        No past surgical history on file.    Family History   Problem Relation Age of Onset    Sleep disorder Mother     Arthritis Mother     Cancer Mother     Diabetes Mother     Mental illness Mother     Obesity Mother     Depression Mother     Miscarriages / Stillbirths Mother     Sleep disorder Father     Arthritis Father     Diabetes Father     Hypertension Father     Migraines Father     Obesity Father     Depression Sister     Miscarriages / Stillbirths Sister     Asthma Maternal Grandmother     Heart attack Maternal Grandfather     Asthma Maternal Grandfather     Cancer Maternal Grandfather     Lung cancer Maternal Grandfather        Social History     Socioeconomic History    Marital status: Single   Tobacco Use    Smoking status: Some Days     Types: Pipe    Smokeless tobacco: Never   Vaping Use    Vaping status: Never Used   Substance and Sexual Activity    Alcohol use: Yes     Alcohol/week: 3.0 standard drinks of alcohol     Types: 2 Cans of beer, 1 Drinks containing 0.5 oz of alcohol per week     Comment: 2 per week    Drug  use: No    Sexual activity: Never       Allergies   Allergen Reactions    Menthol Unknown (See Comments)     Breathing problem       ROS    Review of Systems   Eyes:  Negative for blurred vision.   Respiratory:  Negative for shortness of breath.    Cardiovascular:  Negative for chest pain.   Musculoskeletal:  Positive for arthralgias.   Neurological:  Negative for dizziness and headache.       Vitals:    05/17/24 1126   BP: 124/80   Pulse: 79   SpO2: 98%     Body mass index is 38.41 kg/m².      Current Outpatient Medications:     fluticasone (FLONASE) 50 MCG/ACT nasal spray, 1-2 sprays into the nostril(s) as directed by provider Daily., Disp: 16 g, Rfl: 11    lisinopril (PRINIVIL,ZESTRIL) 10 MG tablet, Take 1 tablet by mouth Daily., Disp: 90 tablet, Rfl: 1    meloxicam (MOBIC) 7.5 MG tablet, Take 1 tablet by mouth 2 (Two) Times a Day As Needed for Moderate Pain., Disp: 180 tablet, Rfl: 0    colchicine 0.6 MG tablet, Take 2 tablets po x 1 dose, then 1 tablet po x 1 dose after 1 hour. (Patient not taking: Reported on 5/17/2024), Disp: 3 tablet, Rfl: 0    PE    Physical Exam  Vitals reviewed.   Constitutional:       General: He is not in acute distress.     Appearance: He is well-developed. He is obese.   HENT:      Head: Normocephalic and atraumatic.   Eyes:      Conjunctiva/sclera: Conjunctivae normal.   Cardiovascular:      Rate and Rhythm: Normal rate and regular rhythm.      Heart sounds: Normal heart sounds. No murmur heard.  Pulmonary:      Effort: Pulmonary effort is normal.      Breath sounds: Normal breath sounds.   Musculoskeletal:      Cervical back: Normal range of motion.   Feet:      Comments: Left 1st MTP joint nontender. No soft tissue swelling or erythema.   Skin:     General: Skin is warm and dry.   Neurological:      Mental Status: He is alert.      Gait: Gait normal.   Psychiatric:         Speech: Speech normal.         Behavior: Behavior normal.         Results    Results for orders placed or  performed in visit on 10/27/23   CBC (No Diff)    Specimen: Blood   Result Value Ref Range    WBC 7.15 3.40 - 10.80 10*3/mm3    RBC 4.90 4.14 - 5.80 10*6/mm3    Hemoglobin 15.6 13.0 - 17.7 g/dL    Hematocrit 44.5 37.5 - 51.0 %    MCV 90.8 79.0 - 97.0 fL    MCH 31.8 26.6 - 33.0 pg    MCHC 35.1 31.5 - 35.7 g/dL    RDW 12.1 (L) 12.3 - 15.4 %    RDW-SD 40.1 37.0 - 54.0 fl    MPV 11.4 6.0 - 12.0 fL    Platelets 205 140 - 450 10*3/mm3   Comprehensive Metabolic Panel    Specimen: Blood   Result Value Ref Range    Glucose 77 65 - 99 mg/dL    BUN 15 6 - 20 mg/dL    Creatinine 1.08 0.76 - 1.27 mg/dL    Sodium 140 136 - 145 mmol/L    Potassium 4.7 3.5 - 5.2 mmol/L    Chloride 102 98 - 107 mmol/L    CO2 25.9 22.0 - 29.0 mmol/L    Calcium 9.3 8.6 - 10.5 mg/dL    Total Protein 7.2 6.0 - 8.5 g/dL    Albumin 4.7 3.5 - 5.2 g/dL    ALT (SGPT) 33 1 - 41 U/L    AST (SGOT) 23 1 - 40 U/L    Alkaline Phosphatase 42 39 - 117 U/L    Total Bilirubin 1.1 0.0 - 1.2 mg/dL    Globulin 2.5 gm/dL    A/G Ratio 1.9 g/dL    BUN/Creatinine Ratio 13.9 7.0 - 25.0    Anion Gap 12.1 5.0 - 15.0 mmol/L    eGFR 96.5 >60.0 mL/min/1.73   TSH Rfx On Abnormal To Free T4    Specimen: Blood   Result Value Ref Range    TSH 1.990 0.270 - 4.200 uIU/mL   Urinalysis With Culture If Indicated - Urine, Clean Catch    Specimen: Urine, Clean Catch   Result Value Ref Range    Color, UA Yellow Yellow, Straw    Appearance, UA Clear Clear    pH, UA 6.5 5.0 - 8.0    Specific Gravity, UA 1.016 1.005 - 1.030    Glucose, UA Negative Negative    Ketones, UA Negative Negative    Bilirubin, UA Negative Negative    Blood, UA Negative Negative    Protein, UA Negative Negative    Leuk Esterase, UA Negative Negative    Nitrite, UA Negative Negative    Urobilinogen, UA 0.2 E.U./dL 0.2 - 1.0 E.U./dL   Hemoglobin A1c    Specimen: Blood   Result Value Ref Range    Hemoglobin A1C 5.30 4.80 - 5.60 %   LDL cholesterol, direct    Specimen: Blood   Result Value Ref Range    LDL Cholesterol  97 0  - 100 mg/dL   [place] Urine Culture Tube - Urine, Clean Catch    Specimen: Urine, Clean Catch   Result Value Ref Range    Extra Tube Hold for add-ons.        A/P    Problem List Items Addressed This Visit          Cardiac and Vasculature    Hypertension - Primary    Relevant Orders    Basic Metabolic Panel     Other Visit Diagnoses       Gout involving toe of left foot, unspecified cause, unspecified chronicity        Relevant Orders    Uric Acid          -Monitor uric acid given several recent flares. He is not on anything for gout prevention. Encouraged low purine diet, weight reduction. Plan is to start urate lower therapy pending his lab results.   -Will continue lisinopril for hypertension. Monitor bmp.   -RTC in 6 months for annual physical, sooner prn.       Plan of care was reviewed with patient at the conclusion of today's visit. Education was provided regarding diagnoses, management, and the importance of keeping follow-up appointments. The patient was counseled regarding the risks, benefits, and possible side-effects of treatment. Patient and/or family express understanding and agreement with the management plan.        Alfredo Gupta PA-C

## 2024-05-18 RX ORDER — ALLOPURINOL 100 MG/1
100 TABLET ORAL DAILY
Qty: 90 TABLET | Refills: 1 | Status: SHIPPED | OUTPATIENT
Start: 2024-05-18

## 2024-09-24 DIAGNOSIS — M25.511 CHRONIC RIGHT SHOULDER PAIN: ICD-10-CM

## 2024-09-24 DIAGNOSIS — G89.29 CHRONIC RIGHT SHOULDER PAIN: ICD-10-CM

## 2024-09-25 RX ORDER — MELOXICAM 7.5 MG/1
7.5 TABLET ORAL 2 TIMES DAILY PRN
Qty: 180 TABLET | Refills: 0 | Status: SHIPPED | OUTPATIENT
Start: 2024-09-25

## 2024-10-02 RX ORDER — LISINOPRIL 10 MG/1
10 TABLET ORAL DAILY
Qty: 90 TABLET | Refills: 0 | Status: SHIPPED | OUTPATIENT
Start: 2024-10-02

## 2024-10-23 RX ORDER — ALLOPURINOL 100 MG/1
100 TABLET ORAL DAILY
Qty: 90 TABLET | Refills: 3 | Status: SHIPPED | OUTPATIENT
Start: 2024-10-23

## 2024-10-23 NOTE — TELEPHONE ENCOUNTER
Rx Refill Note  Requested Prescriptions     Pending Prescriptions Disp Refills    allopurinol (ZYLOPRIM) 100 MG tablet [Pharmacy Med Name: ALLOPURINOL TABS 100MG] 90 tablet 3     Sig: TAKE 1 TABLET DAILY      Last office visit with prescribing clinician: 5/17/2024   Last telemedicine visit with prescribing clinician: Visit date not found   Next office visit with prescribing clinician: 11/15/2024                         Would you like a call back once the refill request has been completed: [] Yes [] No    If the office needs to give you a call back, can they leave a voicemail: [] Yes [] No    Viry Muller MA  10/23/24, 10:36 EDT

## 2024-11-26 ENCOUNTER — OFFICE VISIT (OUTPATIENT)
Dept: FAMILY MEDICINE CLINIC | Facility: CLINIC | Age: 29
End: 2024-11-26
Payer: COMMERCIAL

## 2024-11-26 ENCOUNTER — LAB (OUTPATIENT)
Dept: LAB | Facility: HOSPITAL | Age: 29
End: 2024-11-26
Payer: COMMERCIAL

## 2024-11-26 VITALS
OXYGEN SATURATION: 98 % | SYSTOLIC BLOOD PRESSURE: 126 MMHG | WEIGHT: 277.8 LBS | BODY MASS INDEX: 38.89 KG/M2 | HEART RATE: 88 BPM | HEIGHT: 71 IN | DIASTOLIC BLOOD PRESSURE: 82 MMHG

## 2024-11-26 DIAGNOSIS — M76.61 RIGHT ACHILLES TENDINITIS: ICD-10-CM

## 2024-11-26 DIAGNOSIS — M79.604 RIGHT LEG PAIN: ICD-10-CM

## 2024-11-26 DIAGNOSIS — E66.812 CLASS 2 SEVERE OBESITY DUE TO EXCESS CALORIES WITH SERIOUS COMORBIDITY AND BODY MASS INDEX (BMI) OF 38.0 TO 38.9 IN ADULT: ICD-10-CM

## 2024-11-26 DIAGNOSIS — E66.01 CLASS 2 SEVERE OBESITY DUE TO EXCESS CALORIES WITH SERIOUS COMORBIDITY AND BODY MASS INDEX (BMI) OF 38.0 TO 38.9 IN ADULT: ICD-10-CM

## 2024-11-26 DIAGNOSIS — Z00.00 HEALTHCARE MAINTENANCE: Primary | ICD-10-CM

## 2024-11-26 DIAGNOSIS — M17.0 PRIMARY OSTEOARTHRITIS OF BOTH KNEES: ICD-10-CM

## 2024-11-26 DIAGNOSIS — Z00.00 HEALTHCARE MAINTENANCE: ICD-10-CM

## 2024-11-26 DIAGNOSIS — G47.33 OSA (OBSTRUCTIVE SLEEP APNEA): ICD-10-CM

## 2024-11-26 DIAGNOSIS — I10 PRIMARY HYPERTENSION: ICD-10-CM

## 2024-11-26 LAB
25(OH)D3 SERPL-MCNC: 10.4 NG/ML (ref 30–100)
ALBUMIN SERPL-MCNC: 4.8 G/DL (ref 3.5–5.2)
ALBUMIN/GLOB SERPL: 1.6 G/DL
ALP SERPL-CCNC: 48 U/L (ref 39–117)
ALT SERPL W P-5'-P-CCNC: 41 U/L (ref 1–41)
ANION GAP SERPL CALCULATED.3IONS-SCNC: 10 MMOL/L (ref 5–15)
AST SERPL-CCNC: 27 U/L (ref 1–40)
BILIRUB SERPL-MCNC: 1.5 MG/DL (ref 0–1.2)
BILIRUB UR QL STRIP: NEGATIVE
BUN SERPL-MCNC: 18 MG/DL (ref 6–20)
BUN/CREAT SERPL: 14.8 (ref 7–25)
CALCIUM SPEC-SCNC: 10 MG/DL (ref 8.6–10.5)
CHLORIDE SERPL-SCNC: 101 MMOL/L (ref 98–107)
CHOLEST SERPL-MCNC: 172 MG/DL (ref 0–200)
CLARITY UR: CLEAR
CO2 SERPL-SCNC: 25 MMOL/L (ref 22–29)
COLOR UR: YELLOW
CREAT SERPL-MCNC: 1.22 MG/DL (ref 0.76–1.27)
DEPRECATED RDW RBC AUTO: 38.8 FL (ref 37–54)
EGFRCR SERPLBLD CKD-EPI 2021: 82.3 ML/MIN/1.73
ERYTHROCYTE [DISTWIDTH] IN BLOOD BY AUTOMATED COUNT: 11.7 % (ref 12.3–15.4)
GLOBULIN UR ELPH-MCNC: 3 GM/DL
GLUCOSE SERPL-MCNC: 90 MG/DL (ref 65–99)
GLUCOSE UR STRIP-MCNC: NEGATIVE MG/DL
HBA1C MFR BLD: 5.5 % (ref 4.8–5.6)
HCT VFR BLD AUTO: 46.6 % (ref 37.5–51)
HDLC SERPL-MCNC: 37 MG/DL (ref 40–60)
HGB BLD-MCNC: 15.7 G/DL (ref 13–17.7)
HGB UR QL STRIP.AUTO: NEGATIVE
HOLD SPECIMEN: NORMAL
KETONES UR QL STRIP: ABNORMAL
LDLC SERPL CALC-MCNC: 109 MG/DL (ref 0–100)
LDLC/HDLC SERPL: 2.87 {RATIO}
LEUKOCYTE ESTERASE UR QL STRIP.AUTO: NEGATIVE
MCH RBC QN AUTO: 30.6 PG (ref 26.6–33)
MCHC RBC AUTO-ENTMCNC: 33.7 G/DL (ref 31.5–35.7)
MCV RBC AUTO: 90.8 FL (ref 79–97)
NITRITE UR QL STRIP: NEGATIVE
PH UR STRIP.AUTO: 5.5 [PH] (ref 5–8)
PLATELET # BLD AUTO: 233 10*3/MM3 (ref 140–450)
PMV BLD AUTO: 11.1 FL (ref 6–12)
POTASSIUM SERPL-SCNC: 4.8 MMOL/L (ref 3.5–5.2)
PROT SERPL-MCNC: 7.8 G/DL (ref 6–8.5)
PROT UR QL STRIP: NEGATIVE
RBC # BLD AUTO: 5.13 10*6/MM3 (ref 4.14–5.8)
SODIUM SERPL-SCNC: 136 MMOL/L (ref 136–145)
SP GR UR STRIP: 1.03 (ref 1–1.03)
T4 FREE SERPL-MCNC: 1.28 NG/DL (ref 0.92–1.68)
TRIGL SERPL-MCNC: 144 MG/DL (ref 0–150)
TSH SERPL DL<=0.05 MIU/L-ACNC: 4.51 UIU/ML (ref 0.27–4.2)
URATE SERPL-MCNC: 7.7 MG/DL (ref 3.4–7)
UROBILINOGEN UR QL STRIP: ABNORMAL
VIT B12 BLD-MCNC: 257 PG/ML (ref 211–946)
VLDLC SERPL-MCNC: 26 MG/DL (ref 5–40)
WBC NRBC COR # BLD AUTO: 6.15 10*3/MM3 (ref 3.4–10.8)

## 2024-11-26 PROCEDURE — 84550 ASSAY OF BLOOD/URIC ACID: CPT

## 2024-11-26 PROCEDURE — 84439 ASSAY OF FREE THYROXINE: CPT

## 2024-11-26 PROCEDURE — 80050 GENERAL HEALTH PANEL: CPT

## 2024-11-26 PROCEDURE — 99395 PREV VISIT EST AGE 18-39: CPT | Performed by: PHYSICIAN ASSISTANT

## 2024-11-26 PROCEDURE — 80061 LIPID PANEL: CPT

## 2024-11-26 PROCEDURE — 99213 OFFICE O/P EST LOW 20 MIN: CPT | Performed by: PHYSICIAN ASSISTANT

## 2024-11-26 PROCEDURE — 82607 VITAMIN B-12: CPT

## 2024-11-26 PROCEDURE — 83036 HEMOGLOBIN GLYCOSYLATED A1C: CPT

## 2024-11-26 PROCEDURE — 82306 VITAMIN D 25 HYDROXY: CPT

## 2024-11-26 PROCEDURE — 81003 URINALYSIS AUTO W/O SCOPE: CPT

## 2024-11-26 RX ORDER — FLUTICASONE PROPIONATE 50 MCG
1-2 SPRAY, SUSPENSION (ML) NASAL DAILY
Qty: 16 G | Refills: 11 | Status: SHIPPED | OUTPATIENT
Start: 2024-11-26

## 2024-11-26 RX ORDER — LISINOPRIL 10 MG/1
10 TABLET ORAL DAILY
Qty: 90 TABLET | Refills: 2 | Status: SHIPPED | OUTPATIENT
Start: 2024-11-26

## 2024-11-26 NOTE — PROGRESS NOTES
Reason for visit    Reid Leonardo is a 29 y.o. male who presents for annual comprehensive exam.    Chief Complaint   Patient presents with    Annual Exam     Right leg (spots of pain in calf, thigh and achilles tendon) started a few weeks ago          HPI     Here for physical.   He had right posterior calf pain starting 3 weeks ago. Mostly gone now and has improved with heat. He had a pain in his right thigh that lasted a few days. He denies any calf or thigh pain currently. He has had some bouts of right achilles pain since last summer. Lasts a day or two and resolves if he stays off of it. Denies right leg swelling. He states a maternal uncle and grandmother had blood clots. He reports walking up a steep hill carrying heavy supplies before his symptoms started but denies any injury.      Diet/Physical activity:  -14 lb weight gain from 1 year ago.   -Sedentary lifestyle. In car 2 hours/day, a lot of eating on the road.   -Back at school for 15 months. Finding it difficulty to find time to exercise.   -Going to [employer] in 2 weeks.    Immunizations:  -He has declined updated COVID and pneumococcal vaccines in the past.   -Would like flu vaccine today.     Cancer screening:   -Positive Fhx: spinal cancer (mother), brain tumor (maternal aunt), lung cancer ([facility] - tobacco use)  -Negative Fhx: testicular, prostate, colon cancer, colon polyps, melanoma.    PHQ-9 Depression Screening  Little interest or pleasure in doing things? Several days   Feeling down, depressed, or hopeless? Several days   PHQ-2 Total Score 2   Trouble falling or staying asleep, or sleeping too much? Several days   Feeling tired or having little energy? Several days   Poor appetite or overeating? Not at all   Feeling bad about yourself - or that you are a failure or have let yourself or your family down? Not at all   Trouble concentrating on things, such as reading the newspaper or watching television? Not at all   Moving or speaking so slowly  that other people could have noticed? Or the opposite - being so fidgety or restless that you have been moving around a lot more than usual? Not at all   Thoughts that you would be better off dead, or of hurting yourself in some way? Not at all   PHQ-9 Total Score 4   If you checked off any problems, how difficult have these problems made it for you to do your work, take care of things at home, or get along with other people? Not difficult at all         Substance use:  -He smokes a pipe and cigars intermittently. Denies cigarette use/vaping.   -Denies recreational drug use.  -1 beer per week on average.   -1 pot of coffee daily     Dental/vision/skin:   -Current with eye exam.   -Overdue for dental exam.  -Denies new, changing or concerning skin lesions.    Past Medical History:   Diagnosis Date    Arthritis     Fracture     Headache     Hypertension        No past surgical history on file.    Family History   Problem Relation Age of Onset    Sleep disorder Mother     Arthritis Mother     Cancer Mother     Diabetes Mother     Mental illness Mother     Obesity Mother     Depression Mother     Miscarriages / Stillbirths Mother     Sleep disorder Father     Arthritis Father     Diabetes Father     Hypertension Father     Migraines Father     Obesity Father     Depression Sister     Miscarriages / Stillbirths Sister     Asthma Maternal Grandmother     Heart attack Maternal Grandfather     Asthma Maternal Grandfather     Cancer Maternal Grandfather     Lung cancer Maternal Grandfather        Social History     Socioeconomic History    Marital status: Single   Tobacco Use    Smoking status: Some Days     Current packs/day: 0.25     Average packs/day: 0.3 packs/day for 10.0 years (2.5 ttl pk-yrs)     Types: Pipe, Cigarettes    Smokeless tobacco: Never   Vaping Use    Vaping status: Never Used   Substance and Sexual Activity    Alcohol use: Yes     Alcohol/week: 2.0 standard drinks of alcohol     Types: 2 Cans of beer per  week     Comment: 2 per week    Drug use: No    Sexual activity: Never       Allergies   Allergen Reactions    Menthol Unknown (See Comments)     Breathing problem       ROS    Review of Systems   Constitutional:  Negative for appetite change, chills, diaphoresis, fatigue, fever and unexpected weight loss.   HENT:  Positive for congestion. Negative for hearing loss, sore throat, swollen glands and trouble swallowing.    Eyes:  Negative for blurred vision and visual disturbance.   Respiratory:  Positive for cough. Negative for shortness of breath.    Cardiovascular:  Negative for chest pain.   Gastrointestinal:  Negative for abdominal pain, blood in stool, constipation, diarrhea and GERD.   Endocrine: Negative for cold intolerance, heat intolerance and polydipsia.   Genitourinary:  Negative for difficulty urinating, dysuria, frequency, hematuria, scrotal swelling and testicular pain.   Musculoskeletal:  Positive for arthralgias.   Skin:  Negative for rash and skin lesions.   Neurological:  Negative for dizziness, numbness and headache.   Hematological:  Negative for adenopathy.   Psychiatric/Behavioral:  Positive for depressed mood. Negative for sleep disturbance and suicidal ideas. The patient is not nervous/anxious.        Vitals:    11/26/24 0913   BP: 126/82   Pulse: 88   SpO2: 98%     Body mass index is 38.76 kg/m².      Current Outpatient Medications:     allopurinol (ZYLOPRIM) 100 MG tablet, TAKE 1 TABLET DAILY, Disp: 90 tablet, Rfl: 3    fluticasone (FLONASE) 50 MCG/ACT nasal spray, 1-2 sprays into the nostril(s) as directed by provider Daily., Disp: 16 g, Rfl: 11    lisinopril (PRINIVIL,ZESTRIL) 10 MG tablet, TAKE 1 TABLET DAILY, Disp: 90 tablet, Rfl: 0    meloxicam (MOBIC) 7.5 MG tablet, Take 1 tablet by mouth 2 (Two) Times a Day As Needed for Moderate Pain., Disp: 180 tablet, Rfl: 0    PE    Physical Exam  Vitals reviewed.   Constitutional:       General: He is not in acute distress.     Appearance: He is  well-developed. He is obese.   HENT:      Head: Normocephalic and atraumatic.      Right Ear: Hearing and tympanic membrane normal.      Left Ear: Hearing and tympanic membrane normal.      Mouth/Throat:      Mouth: Mucous membranes are moist.      Dentition: Normal dentition.      Pharynx: Oropharynx is clear.   Eyes:      Extraocular Movements: Extraocular movements intact.      Conjunctiva/sclera: Conjunctivae normal.      Pupils: Pupils are equal, round, and reactive to light.      Comments:      Neck:      Thyroid: No thyroid mass or thyromegaly.      Vascular: No carotid bruit.   Cardiovascular:      Rate and Rhythm: Normal rate and regular rhythm.      Heart sounds: No murmur heard.     No friction rub.   Pulmonary:      Effort: Pulmonary effort is normal.      Breath sounds: Normal breath sounds.   Abdominal:      General: Bowel sounds are normal. There is no abdominal bruit.      Palpations: Abdomen is soft. There is no mass.      Tenderness: There is no abdominal tenderness.   Musculoskeletal:         General: Normal range of motion.      Cervical back: Normal range of motion and neck supple.      Right ankle:      Right Achilles Tendon: Tenderness present.      Comments: No right calf TTP or right leg edema or skin color changes. Right achilles TTP at calcaneal insertion. Dorsiflexion/plantar flexion intact, 5/5.    Lymphadenopathy:      Cervical: No cervical adenopathy.      Upper Body:      Right upper body: No supraclavicular adenopathy.      Left upper body: No supraclavicular adenopathy.   Skin:     General: Skin is warm and dry.      Findings: No rash.      Nails: There is no clubbing.      Comments: No suspicious nevi on clothed exam.    Neurological:      Mental Status: He is alert.      Gait: Gait normal.      Deep Tendon Reflexes: Reflexes normal.   Psychiatric:         Speech: Speech normal.         Behavior: Behavior normal.         A/P    Problem List Items Addressed This Visit           Cardiac and Vasculature    Hypertension    Relevant Medications    lisinopril (PRINIVIL,ZESTRIL) 10 MG tablet       Endocrine and Metabolic    Class 2 obesity due to excess calories with body mass index (BMI) of 38.0 to 38.9 in adult       Health Encounters    Healthcare maintenance - Primary    Relevant Orders    CBC (No Diff)    Comprehensive Metabolic Panel    Lipid Panel    TSH Rfx On Abnormal To Free T4    Urinalysis With Culture If Indicated - Urine, Clean Catch    Hemoglobin A1c    Uric Acid    Vitamin D,25-Hydroxy    Vitamin B12       Musculoskeletal and Injuries    Primary osteoarthritis of both knees       Sleep    RICHIE (obstructive sleep apnea)    Overview     Uses CPAP.           Other Visit Diagnoses       Right Achilles tendinitis        Right leg pain              -Counseled patient regarding cancer screening, immunizations, healthy lifestyle, diet, maintaining a healthy weight, and exercise. 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   -Annual dental and eye exams were encouraged.   -Check labs as ordered.   -I suspect he had muscle strains of his right leg. These symptoms have improved spontaneously and he never had any swelling or other symptoms suggesting DVT. He has mild right achilles pain on exam. Will trial rest, icing prn, continue meloxicam, and home stretches. He declines referral to PT at this time. I recommended PT if he is not better after 6 weeks.   -Will hold off on influenza vaccine today as he is getting over a mild URI. He can receive this at his pharmacy when his symptoms resolve.   -RTC in 6 months for hypertension follow-up, sooner prn.       Plan of care was reviewed with patient at the conclusion of today's visit. Education was provided regarding diagnoses, management, treatment plan, and the importance of keeping follow-up appointments. The patient was counseled regarding the  risks, benefits, and possible side-effects of treatment. Patient and/or family expresses understanding and agreement with the management plan.        Alfredo Gupta PA-C

## 2024-11-27 DIAGNOSIS — E79.0 HYPERURICEMIA: ICD-10-CM

## 2024-11-27 DIAGNOSIS — M10.9 GOUT INVOLVING TOE OF LEFT FOOT, UNSPECIFIED CAUSE, UNSPECIFIED CHRONICITY: ICD-10-CM

## 2024-11-27 DIAGNOSIS — R94.6 ABNORMAL THYROID FUNCTION TEST: Primary | ICD-10-CM

## 2024-11-27 RX ORDER — ALLOPURINOL 100 MG/1
200 TABLET ORAL DAILY
Qty: 180 TABLET | Refills: 3 | Status: SHIPPED | OUTPATIENT
Start: 2024-11-27

## 2024-11-27 RX ORDER — UREA 10 %
500 LOTION (ML) TOPICAL 2 TIMES WEEKLY
Qty: 26 TABLET | Refills: 3 | Status: SHIPPED | OUTPATIENT
Start: 2024-11-28

## 2024-11-27 RX ORDER — ERGOCALCIFEROL 1.25 MG/1
50000 CAPSULE, LIQUID FILLED ORAL WEEKLY
Qty: 12 CAPSULE | Refills: 0 | Status: SHIPPED | OUTPATIENT
Start: 2024-11-27 | End: 2025-02-13

## 2025-01-22 ENCOUNTER — LAB (OUTPATIENT)
Dept: LAB | Facility: HOSPITAL | Age: 30
End: 2025-01-22
Payer: COMMERCIAL

## 2025-01-22 DIAGNOSIS — E79.0 HYPERURICEMIA: ICD-10-CM

## 2025-01-22 DIAGNOSIS — R94.6 ABNORMAL THYROID FUNCTION TEST: ICD-10-CM

## 2025-01-22 LAB
T4 FREE SERPL-MCNC: 1.18 NG/DL (ref 0.92–1.68)
TSH SERPL DL<=0.05 MIU/L-ACNC: 2.39 UIU/ML (ref 0.27–4.2)
URATE SERPL-MCNC: 6.4 MG/DL (ref 3.4–7)

## 2025-01-22 PROCEDURE — 86800 THYROGLOBULIN ANTIBODY: CPT

## 2025-01-22 PROCEDURE — 86376 MICROSOMAL ANTIBODY EACH: CPT

## 2025-01-22 PROCEDURE — 84443 ASSAY THYROID STIM HORMONE: CPT

## 2025-01-22 PROCEDURE — 84439 ASSAY OF FREE THYROXINE: CPT

## 2025-01-22 PROCEDURE — 84550 ASSAY OF BLOOD/URIC ACID: CPT

## 2025-01-23 LAB
THYROGLOB AB SERPL-ACNC: <1 IU/ML (ref 0–0.9)
THYROPEROXIDASE AB SERPL-ACNC: 10 IU/ML (ref 0–34)

## 2025-02-24 ENCOUNTER — TELEMEDICINE (OUTPATIENT)
Dept: FAMILY MEDICINE CLINIC | Facility: CLINIC | Age: 30
End: 2025-02-24
Payer: COMMERCIAL

## 2025-02-24 DIAGNOSIS — F43.23 ADJUSTMENT DISORDER WITH MIXED ANXIETY AND DEPRESSED MOOD: Primary | ICD-10-CM

## 2025-02-24 PROCEDURE — 99214 OFFICE O/P EST MOD 30 MIN: CPT | Performed by: PHYSICIAN ASSISTANT

## 2025-02-24 RX ORDER — HYDROXYZINE HYDROCHLORIDE 25 MG/1
25 TABLET, FILM COATED ORAL EVERY 8 HOURS PRN
Qty: 30 TABLET | Refills: 1 | Status: SHIPPED | OUTPATIENT
Start: 2025-02-24

## 2025-02-24 NOTE — PROGRESS NOTES
"    Chief Complaint   Patient presents with    Anxiety    Depression       HPI     Reid Leonardo is a pleasant 29 y.o. male who presents for a video visit. You have chosen to receive care through a telehealth visit. Do you consent to use a video/audio connection for your medical care today? Yes.    Patient location: [place], [place]  Provider location: [facility] office.     He reports a lot of stress from school and work and \"fighting off\" panic attacks for several weeks now with with deep breathing exercises. He has always tended toward anxiety when a lot is asked of him. More credit hours currently than he has had his whole life. Studying to be . He has had 6 weeks without a day off. Can't relax and has difficulty focusing due to his symptoms. He is not sleeping well. He does report mood dipping into depression at times as well. His sister and mother have anxiety and are both treated with sertraline. He has a therapist at his school currently and has had a few visits. In past on wellbutrin and Prozac but stopped medication when he improved. He denies SI/HI.     Past Medical History:   Diagnosis Date    Arthritis     Fracture     Headache     Hypertension        No past surgical history on file.    Family History   Problem Relation Age of Onset    Sleep disorder Mother     Arthritis Mother     Cancer Mother     Diabetes Mother     Mental illness Mother     Obesity Mother     Depression Mother     Miscarriages / Stillbirths Mother     Sleep disorder Father     Arthritis Father     Diabetes Father     Hypertension Father     Migraines Father     Obesity Father     Depression Sister     Miscarriages / Stillbirths Sister     Asthma Maternal Grandmother     Heart attack Maternal Grandfather     Asthma Maternal Grandfather     Cancer Maternal Grandfather     Lung cancer Maternal Grandfather        Social History     Socioeconomic History    Marital status: Single   Tobacco Use    Smoking status: Some Days "     Current packs/day: 0.25     Average packs/day: 0.3 packs/day for 10.0 years (2.5 ttl pk-yrs)     Types: Pipe, Cigarettes    Smokeless tobacco: Never   Vaping Use    Vaping status: Never Used   Substance and Sexual Activity    Alcohol use: Yes     Alcohol/week: 2.0 standard drinks of alcohol     Types: 2 Cans of beer per week     Comment: 2 per week    Drug use: No    Sexual activity: Never       Allergies   Allergen Reactions    Menthol Unknown (See Comments)     Breathing problem       ROS    Review of Systems   Psychiatric/Behavioral:  Positive for depressed mood. Negative for suicidal ideas. The patient is nervous/anxious.        There were no vitals filed for this visit.  There is no height or weight on file to calculate BMI.      Current Outpatient Medications:     allopurinol (ZYLOPRIM) 100 MG tablet, Take 2 tablets by mouth Daily., Disp: 180 tablet, Rfl: 3    fluticasone (FLONASE) 50 MCG/ACT nasal spray, Administer 1-2 sprays into the nostril(s) as directed by provider Daily., Disp: 16 g, Rfl: 11    hydrOXYzine (ATARAX) 25 MG tablet, Take 1 tablet by mouth Every 8 (Eight) Hours As Needed for Anxiety., Disp: 30 tablet, Rfl: 1    lisinopril (PRINIVIL,ZESTRIL) 10 MG tablet, Take 1 tablet by mouth Daily., Disp: 90 tablet, Rfl: 2    meloxicam (MOBIC) 7.5 MG tablet, Take 1 tablet by mouth 2 (Two) Times a Day As Needed for Moderate Pain., Disp: 180 tablet, Rfl: 0    sertraline (Zoloft) 50 MG tablet, Take 1/2 tablet qd x 7d, then 1 tab daily, Disp: 30 tablet, Rfl: 2    vitamin B-12 (CYANOCOBALAMIN) 500 MCG tablet, Take 1 tablet by mouth 2 (Two) Times a Week., Disp: 26 tablet, Rfl: 3    PE    Physical Exam  Vitals reviewed.   Constitutional:       General: He is not in acute distress.  Pulmonary:      Effort: Pulmonary effort is normal. No respiratory distress.   Neurological:      Mental Status: He is alert.   Psychiatric:         Mood and Affect: Mood normal.          A/P    Problem List Items Addressed This  Visit    None  Visit Diagnoses       Adjustment disorder with mixed anxiety and depressed mood    -  Primary    Relevant Medications    sertraline (Zoloft) 50 MG tablet    hydrOXYzine (ATARAX) 25 MG tablet          -COSMO-7: 16 c/w severe anxiety and PHQ-9: 15 today.   -He would like to start sertraline and hydroxyzine after discussion. Encouraged meeting with his therapist at his school. He was counseled to let me know if he would like a referral to meet with a talk therapist through [facility]. He denies any SI/HI. Discussed safety plan.   -Recheck in 6 weeks, sooner prn.     Plan of care was reviewed with patient at the conclusion of today's visit. Education was provided regarding diagnoses, management, prescribed or recommended OTC products, and the importance of compliance with follow-up appointments. The patient was counseled regarding the risks, benefits, and possible side-effects of treatment. I advised the patient to keep me informed of any acute changes in their status including new, worsening, or persistent symptoms. Patient expresses understanding and agreement with the management plan.        Alfredo Gupta PA-C

## 2025-03-20 NOTE — TELEPHONE ENCOUNTER
Rx Refill Note  Requested Prescriptions     Pending Prescriptions Disp Refills    sertraline (Zoloft) 50 MG tablet 90 tablet 0     Sig: Take 1/2 tablet qd x 7d, then 1 tab daily      Last office visit with prescribing clinician: 11/26/2024   Last telemedicine visit with prescribing clinician: 2/24/2025   Next office visit with prescribing clinician: 4/14/2025     Cisco Ferguson MA  03/20/25, 09:00 EDT

## 2025-03-23 DIAGNOSIS — G89.29 CHRONIC RIGHT SHOULDER PAIN: ICD-10-CM

## 2025-03-23 DIAGNOSIS — M25.511 CHRONIC RIGHT SHOULDER PAIN: ICD-10-CM

## 2025-03-25 RX ORDER — MELOXICAM 7.5 MG/1
7.5 TABLET ORAL 2 TIMES DAILY PRN
Qty: 180 TABLET | Refills: 1 | Status: SHIPPED | OUTPATIENT
Start: 2025-03-25

## 2025-04-14 ENCOUNTER — OFFICE VISIT (OUTPATIENT)
Dept: FAMILY MEDICINE CLINIC | Facility: CLINIC | Age: 30
End: 2025-04-14
Payer: COMMERCIAL

## 2025-04-14 VITALS
SYSTOLIC BLOOD PRESSURE: 122 MMHG | HEART RATE: 61 BPM | WEIGHT: 251.2 LBS | BODY MASS INDEX: 35.05 KG/M2 | DIASTOLIC BLOOD PRESSURE: 74 MMHG | OXYGEN SATURATION: 97 %

## 2025-04-14 DIAGNOSIS — F43.23 ADJUSTMENT DISORDER WITH MIXED ANXIETY AND DEPRESSED MOOD: Primary | ICD-10-CM

## 2025-04-14 PROCEDURE — 99213 OFFICE O/P EST LOW 20 MIN: CPT | Performed by: PHYSICIAN ASSISTANT

## 2025-04-14 NOTE — PROGRESS NOTES
"Chief Complaint   Patient presents with    Anxiety     6-8 week f/u for anxiety and depression          HPI     Reid Leonardo is a 29 y.o. male who is here for 6 week follow-up of  anxiety and depression .     He states he is better able to manage negative thoughts and is no longer having panic attacks or \"spiraling\" like he was previously. He feels more stable. Has not needed to use hydroxyzine often. It did cause somnolence. He is hoping to see a therapist locally when he gets done with his school. He has a lot of other support through school if he needs it. He initially had some GI side-effects and thumb twitching after starting sertraline. Sometimes random episodes of loose stools, no daily symptoms.     Past Medical History:   Diagnosis Date    Arthritis     Fracture     Headache     Hypertension        No past surgical history on file.    Family History   Problem Relation Age of Onset    Sleep disorder Mother     Arthritis Mother     Cancer Mother     Diabetes Mother     Mental illness Mother     Obesity Mother     Depression Mother     Miscarriages / Stillbirths Mother     Sleep apnea Mother     Sleep disorder Father     Arthritis Father     Diabetes Father     Hypertension Father     Migraines Father     Obesity Father     Sleep apnea Father     Depression Sister     Miscarriages / Stillbirths Sister     Asthma Maternal Grandmother     Heart attack Maternal Grandfather     Asthma Maternal Grandfather     Cancer Maternal Grandfather     Lung cancer Maternal Grandfather        Social History     Socioeconomic History    Marital status: Single   Tobacco Use    Smoking status: Some Days     Types: Pipe    Smokeless tobacco: Never   Vaping Use    Vaping status: Never Used   Substance and Sexual Activity    Alcohol use: Yes     Alcohol/week: 2.0 standard drinks of alcohol     Types: 2 Cans of beer per week     Comment: 2 per week    Drug use: No    Sexual activity: Never       Allergies   Allergen Reactions "    Menthol Unknown (See Comments)     Breathing problem       ROS    Review of Systems   Psychiatric/Behavioral:  Positive for depressed mood. Negative for suicidal ideas. The patient is nervous/anxious.        Vitals:    04/14/25 0849   BP: 122/74   Pulse: 61   SpO2: 97%     Body mass index is 35.05 kg/m².      Current Outpatient Medications:     allopurinol (ZYLOPRIM) 100 MG tablet, Take 2 tablets by mouth Daily., Disp: 180 tablet, Rfl: 3    fluticasone (FLONASE) 50 MCG/ACT nasal spray, Administer 1-2 sprays into the nostril(s) as directed by provider Daily., Disp: 16 g, Rfl: 11    hydrOXYzine (ATARAX) 25 MG tablet, Take 1 tablet by mouth Every 8 (Eight) Hours As Needed for Anxiety., Disp: 30 tablet, Rfl: 1    lisinopril (PRINIVIL,ZESTRIL) 10 MG tablet, Take 1 tablet by mouth Daily., Disp: 90 tablet, Rfl: 2    meloxicam (MOBIC) 7.5 MG tablet, Take 1 tablet by mouth 2 (Two) Times a Day As Needed for Moderate Pain., Disp: 180 tablet, Rfl: 1    sertraline (Zoloft) 50 MG tablet, Take 1 tablet by mouth Daily., Disp: 90 tablet, Rfl: 3    vitamin B-12 (CYANOCOBALAMIN) 500 MCG tablet, Take 1 tablet by mouth 2 (Two) Times a Week., Disp: 26 tablet, Rfl: 3    PE    Physical Exam  Vitals reviewed.   Constitutional:       General: He is not in acute distress.  Pulmonary:      Effort: Pulmonary effort is normal. No respiratory distress.   Neurological:      Mental Status: He is alert.   Psychiatric:         Mood and Affect: Mood normal.         Results    Results for orders placed or performed in visit on 01/22/25   TSH    Collection Time: 01/22/25  9:58 AM    Specimen: Blood   Result Value Ref Range    TSH 2.390 0.270 - 4.200 uIU/mL   T4, Free    Collection Time: 01/22/25  9:58 AM    Specimen: Blood   Result Value Ref Range    Free T4 1.18 0.92 - 1.68 ng/dL   Uric Acid    Collection Time: 01/22/25  9:58 AM    Specimen: Blood   Result Value Ref Range    Uric Acid 6.4 3.4 - 7.0 mg/dL   Thyroid Peroxidase Antibody    Collection  Time: 01/22/25  9:58 AM    Specimen: Blood   Result Value Ref Range    Thyroid Peroxidase Antibody 10 0 - 34 IU/mL   Thyroglobulin Antibody    Collection Time: 01/22/25  9:58 AM    Specimen: Blood   Result Value Ref Range    Thyroglobulin Ab <1.0 0.0 - 0.9 IU/mL       A/P    Problem List Items Addressed This Visit    None  Visit Diagnoses         Adjustment disorder with mixed anxiety and depressed mood    -  Primary    Relevant Medications    sertraline (Zoloft) 50 MG tablet          -Mood is much improved after starting sertraline. He is having mild GI side-effects but is otherwise tolerating sertraline well and would like to continue the medication. Recommended hydroxyzine 1/2 tab and only at night prn.   -RTC as previously scheduled on 5/27 for routine follow-up, sooner prn.     Plan of care was reviewed with patient at the conclusion of today's visit. Education was provided regarding diagnoses, management, and the importance of keeping follow-up appointments. The patient was counseled regarding the risks, benefits, and possible side-effects of treatment. Patient and/or family express understanding and agreement with the management plan.        Alfredo Gupta PA-C  Answers submitted by the patient for this visit:  Anxiety (Submitted on 4/14/2025)  Chief Complaint: Anxiety  Visit: follow-up  Frequency: rarely  Severity: moderate  excessive worry: Yes  insomnia: Yes  irritability: Yes  malaise/fatigue: Yes  Sleep quality: fair  Hours of sleep per night: 6 Hours  Aggravated by: work stress  Medication compliance: %  Side effects: constipation/diarrhea  Additional information: I’ve noticed significant improvement since beginning my medication

## 2025-05-22 ENCOUNTER — LAB (OUTPATIENT)
Dept: LAB | Facility: HOSPITAL | Age: 30
End: 2025-05-22
Payer: COMMERCIAL

## 2025-05-22 ENCOUNTER — OFFICE VISIT (OUTPATIENT)
Dept: FAMILY MEDICINE CLINIC | Facility: CLINIC | Age: 30
End: 2025-05-22
Payer: COMMERCIAL

## 2025-05-22 VITALS
SYSTOLIC BLOOD PRESSURE: 130 MMHG | BODY MASS INDEX: 35.19 KG/M2 | DIASTOLIC BLOOD PRESSURE: 86 MMHG | OXYGEN SATURATION: 98 % | WEIGHT: 251.4 LBS | HEIGHT: 71 IN | HEART RATE: 75 BPM

## 2025-05-22 DIAGNOSIS — F43.23 ADJUSTMENT DISORDER WITH MIXED ANXIETY AND DEPRESSED MOOD: ICD-10-CM

## 2025-05-22 DIAGNOSIS — M10.9 GOUT INVOLVING TOE OF LEFT FOOT, UNSPECIFIED CAUSE, UNSPECIFIED CHRONICITY: ICD-10-CM

## 2025-05-22 DIAGNOSIS — I10 PRIMARY HYPERTENSION: ICD-10-CM

## 2025-05-22 DIAGNOSIS — I10 PRIMARY HYPERTENSION: Primary | ICD-10-CM

## 2025-05-22 LAB
ANION GAP SERPL CALCULATED.3IONS-SCNC: 13 MMOL/L (ref 5–15)
BUN SERPL-MCNC: 13 MG/DL (ref 6–20)
BUN/CREAT SERPL: 10.3 (ref 7–25)
CALCIUM SPEC-SCNC: 10 MG/DL (ref 8.6–10.5)
CHLORIDE SERPL-SCNC: 100 MMOL/L (ref 98–107)
CO2 SERPL-SCNC: 25 MMOL/L (ref 22–29)
CREAT SERPL-MCNC: 1.26 MG/DL (ref 0.76–1.27)
EGFRCR SERPLBLD CKD-EPI 2021: 79.2 ML/MIN/1.73
GLUCOSE SERPL-MCNC: 95 MG/DL (ref 65–99)
POTASSIUM SERPL-SCNC: 4.7 MMOL/L (ref 3.5–5.2)
SODIUM SERPL-SCNC: 138 MMOL/L (ref 136–145)

## 2025-05-22 PROCEDURE — 80048 BASIC METABOLIC PNL TOTAL CA: CPT

## 2025-05-22 PROCEDURE — 99214 OFFICE O/P EST MOD 30 MIN: CPT | Performed by: PHYSICIAN ASSISTANT

## 2025-05-22 NOTE — PROGRESS NOTES
Chief Complaint   Patient presents with    Hypertension     Follow-up hypertension.        HPI     Reid Leonardo is a 29 y.o. male who is here for 6 month follow-up of hypertension and chronic conditions.      He started having a can of black beans and eggs in the morning which has helped with appetite and eating less throughout the day. Also cutting down carbs and having more salads for dinner. He has lost 16 pounds.   He does not often check his blood pressure at home.   He has not had any recent gout flares and continues on allopurinol.  Zoloft causes diarrhea if he takes it on empty stomach but yet does not have any problems if he takes it with food.  It has been working well for his mood.    Past Medical History:   Diagnosis Date    Arthritis     Fracture     Headache     Hypertension        History reviewed. No pertinent surgical history.    Family History   Problem Relation Age of Onset    Sleep disorder Mother     Arthritis Mother     Cancer Mother     Diabetes Mother     Mental illness Mother     Obesity Mother     Depression Mother     Miscarriages / Stillbirths Mother     Sleep apnea Mother     Sleep disorder Father     Arthritis Father     Diabetes Father     Hypertension Father     Migraines Father     Obesity Father     Sleep apnea Father     Depression Sister     Miscarriages / Stillbirths Sister     Asthma Maternal Grandmother     Heart attack Maternal Grandfather     Asthma Maternal Grandfather     Cancer Maternal Grandfather     Lung cancer Maternal Grandfather        Social History     Socioeconomic History    Marital status: Single   Tobacco Use    Smoking status: Some Days     Current packs/day: 0.25     Average packs/day: 0.3 packs/day for 10.0 years (2.5 ttl pk-yrs)     Types: Pipe, Cigarettes    Smokeless tobacco: Never   Vaping Use    Vaping status: Never Used   Substance and Sexual Activity    Alcohol use: Yes     Alcohol/week: 2.0 standard drinks of alcohol     Types: 2 Cans of beer  per week     Comment: 2 per week    Drug use: No    Sexual activity: Never       Allergies   Allergen Reactions    Menthol Unknown (See Comments)     Breathing problem       ROS    Review of Systems   Constitutional:  Negative for chills, diaphoresis, fatigue and fever.   HENT:  Negative for congestion, sore throat and swollen glands.    Eyes:  Negative for blurred vision.   Respiratory:  Negative for cough and shortness of breath.    Cardiovascular:  Negative for chest pain.   Gastrointestinal:  Positive for diarrhea. Negative for abdominal pain, nausea and vomiting.   Genitourinary:  Negative for dysuria.   Musculoskeletal:  Negative for myalgias and neck pain.   Skin:  Negative for rash.   Neurological:  Negative for weakness, numbness and headache.       Vitals:    05/22/25 1003   BP: 130/86   Pulse: 75   SpO2: 98%     Body mass index is 35.08 kg/m².      Current Outpatient Medications:     allopurinol (ZYLOPRIM) 100 MG tablet, Take 2 tablets by mouth Daily., Disp: 180 tablet, Rfl: 3    hydrOXYzine (ATARAX) 25 MG tablet, Take 1 tablet by mouth Every 8 (Eight) Hours As Needed for Anxiety., Disp: 30 tablet, Rfl: 1    lisinopril (PRINIVIL,ZESTRIL) 10 MG tablet, Take 1 tablet by mouth Daily., Disp: 90 tablet, Rfl: 2    meloxicam (MOBIC) 7.5 MG tablet, Take 1 tablet by mouth 2 (Two) Times a Day As Needed for Moderate Pain., Disp: 180 tablet, Rfl: 1    sertraline (Zoloft) 50 MG tablet, Take 1 tablet by mouth Daily., Disp: 90 tablet, Rfl: 3    vitamin B-12 (CYANOCOBALAMIN) 500 MCG tablet, Take 1 tablet by mouth 2 (Two) Times a Week., Disp: 26 tablet, Rfl: 3    fluticasone (FLONASE) 50 MCG/ACT nasal spray, Administer 1-2 sprays into the nostril(s) as directed by provider Daily., Disp: 16 g, Rfl: 11    PE    Physical Exam  Vitals reviewed.   Constitutional:       General: He is not in acute distress.     Appearance: He is well-developed.   HENT:      Head: Normocephalic and atraumatic.   Eyes:      Conjunctiva/sclera:  Conjunctivae normal.   Cardiovascular:      Rate and Rhythm: Normal rate and regular rhythm.      Heart sounds: Normal heart sounds. No murmur heard.  Pulmonary:      Effort: Pulmonary effort is normal.      Breath sounds: Normal breath sounds.   Musculoskeletal:      Cervical back: Normal range of motion.   Skin:     General: Skin is warm and dry.   Neurological:      Mental Status: He is alert.      Gait: Gait normal.   Psychiatric:         Speech: Speech normal.         Behavior: Behavior normal.         Results    Results for orders placed or performed in visit on 01/22/25   TSH    Collection Time: 01/22/25  9:58 AM    Specimen: Blood   Result Value Ref Range    TSH 2.390 0.270 - 4.200 uIU/mL   T4, Free    Collection Time: 01/22/25  9:58 AM    Specimen: Blood   Result Value Ref Range    Free T4 1.18 0.92 - 1.68 ng/dL   Uric Acid    Collection Time: 01/22/25  9:58 AM    Specimen: Blood   Result Value Ref Range    Uric Acid 6.4 3.4 - 7.0 mg/dL   Thyroid Peroxidase Antibody    Collection Time: 01/22/25  9:58 AM    Specimen: Blood   Result Value Ref Range    Thyroid Peroxidase Antibody 10 0 - 34 IU/mL   Thyroglobulin Antibody    Collection Time: 01/22/25  9:58 AM    Specimen: Blood   Result Value Ref Range    Thyroglobulin Ab <1.0 0.0 - 0.9 IU/mL       A/P    Problem List Items Addressed This Visit          Cardiac and Vasculature    Hypertension - Primary    Relevant Orders    Basic Metabolic Panel     Other Visit Diagnoses         Adjustment disorder with mixed anxiety and depressed mood          Gout involving toe of left foot, unspecified cause, unspecified chronicity              -Hypertension is controlled.  Monitor BMP to evaluate for electrolyte disturbance on ACE inhibitor therapy.  -He would like to continue Zoloft taken with food at this time and let me know if he has any worsening GI side effects.  -Continue allopurinol.  His last uric acid was 6.4 in January.  -Return to clinic in 6 months for an annual  physical, sooner as needed.    Plan of care was reviewed with patient at the conclusion of today's visit. Education was provided regarding diagnoses, management, and the importance of keeping follow-up appointments. The patient was counseled regarding the risks, benefits, and possible side-effects of treatment. Patient and/or family express understanding and agreement with the management plan.        Alfredo Gupta PA-C  Answers submitted by the patient for this visit:  Problem not listed (Submitted on 5/21/2025)  Chief Complaint: Other medical problem  Reason for appointment: Physical  anorexia: No  joint pain: Yes  change in stool: Yes  headaches: No  joint swelling: No  vertigo: No  visual change: No  Onset: more than 5 years  Medications tried: Melodicam. Aleve. Advil  Additional information: The diarrhea is a side effect of the Zoloft if taken without food

## 2025-06-11 RX ORDER — ALLOPURINOL 100 MG/1
200 TABLET ORAL DAILY
Qty: 180 TABLET | Refills: 0 | Status: SHIPPED | OUTPATIENT
Start: 2025-06-11

## 2025-06-11 NOTE — TELEPHONE ENCOUNTER
"    Caller: Reid Leonardo \"Martín\"    Relationship: Self    Best call back number: [phone]     Requested Prescriptions:   Requested Prescriptions     Pending Prescriptions Disp Refills    allopurinol (ZYLOPRIM) 100 MG tablet 180 tablet 3     Sig: Take 2 tablets by mouth Daily.        Pharmacy where request should be sent: [facility] [address] [phone] [facility] [phone]      Last office visit with prescribing clinician: 5/22/2025   Last telemedicine visit with prescribing clinician: 2/24/2025   Next office visit with prescribing clinician: 12/12/2025     Additional details provided by patient: WANTS 200 MG DAILY    Does the patient have less than a 3 day supply:  [x] Yes  [] No    Would you like a call back once the refill request has been completed: [] Yes [] No    If the office needs to give you a call back, can they leave a voicemail: [] Yes [] No    Patrizia Jacome   06/11/25 09:24 EDT     "